# Patient Record
Sex: MALE | Race: WHITE | NOT HISPANIC OR LATINO | Employment: FULL TIME | URBAN - METROPOLITAN AREA
[De-identification: names, ages, dates, MRNs, and addresses within clinical notes are randomized per-mention and may not be internally consistent; named-entity substitution may affect disease eponyms.]

---

## 2023-08-18 DIAGNOSIS — R07.9 CHEST PAIN, UNSPECIFIED TYPE: ICD-10-CM

## 2023-08-18 DIAGNOSIS — R11.0 NAUSEA: ICD-10-CM

## 2023-08-22 ENCOUNTER — APPOINTMENT (OUTPATIENT)
Dept: PRIMARY CARE | Facility: CLINIC | Age: 53
End: 2023-08-22
Payer: COMMERCIAL

## 2023-08-28 NOTE — PROGRESS NOTES
Spoke with patient and informed, patient stated he has PVC's and wanted to know if that's normal as well? Anything he should worry about ?

## 2023-12-20 ENCOUNTER — OFFICE VISIT (OUTPATIENT)
Dept: CARDIOLOGY | Facility: HOSPITAL | Age: 53
End: 2023-12-20
Payer: COMMERCIAL

## 2023-12-20 ENCOUNTER — HOSPITAL ENCOUNTER (OUTPATIENT)
Dept: RADIOLOGY | Facility: HOSPITAL | Age: 53
Discharge: HOME | End: 2023-12-20
Payer: COMMERCIAL

## 2023-12-20 ENCOUNTER — LAB (OUTPATIENT)
Dept: LAB | Facility: LAB | Age: 53
End: 2023-12-20
Payer: COMMERCIAL

## 2023-12-20 VITALS
WEIGHT: 167.77 LBS | BODY MASS INDEX: 24.78 KG/M2 | SYSTOLIC BLOOD PRESSURE: 145 MMHG | OXYGEN SATURATION: 99 % | HEART RATE: 77 BPM | DIASTOLIC BLOOD PRESSURE: 77 MMHG

## 2023-12-20 DIAGNOSIS — R06.02 SHORTNESS OF BREATH: ICD-10-CM

## 2023-12-20 DIAGNOSIS — I26.99 PULMONARY EMBOLISM, UNSPECIFIED CHRONICITY, UNSPECIFIED PULMONARY EMBOLISM TYPE, UNSPECIFIED WHETHER ACUTE COR PULMONALE PRESENT (MULTI): ICD-10-CM

## 2023-12-20 DIAGNOSIS — D68.51 FACTOR V LEIDEN (MULTI): ICD-10-CM

## 2023-12-20 DIAGNOSIS — R06.02 SHORTNESS OF BREATH: Primary | ICD-10-CM

## 2023-12-20 LAB
ANION GAP SERPL CALC-SCNC: 15 MMOL/L (ref 10–20)
BUN SERPL-MCNC: 15 MG/DL (ref 6–23)
CALCIUM SERPL-MCNC: 9.9 MG/DL (ref 8.6–10.3)
CHLORIDE SERPL-SCNC: 104 MMOL/L (ref 98–107)
CO2 SERPL-SCNC: 25 MMOL/L (ref 21–32)
CREAT SERPL-MCNC: 0.92 MG/DL (ref 0.5–1.3)
GFR SERPL CREATININE-BSD FRML MDRD: >90 ML/MIN/1.73M*2
GLUCOSE SERPL-MCNC: 96 MG/DL (ref 74–99)
POTASSIUM SERPL-SCNC: 4.8 MMOL/L (ref 3.5–5.3)
SODIUM SERPL-SCNC: 139 MMOL/L (ref 136–145)

## 2023-12-20 PROCEDURE — 71275 CT ANGIOGRAPHY CHEST: CPT

## 2023-12-20 PROCEDURE — 93005 ELECTROCARDIOGRAM TRACING: CPT | Performed by: INTERNAL MEDICINE

## 2023-12-20 PROCEDURE — 2550000001 HC RX 255 CONTRASTS: Performed by: NURSE PRACTITIONER

## 2023-12-20 PROCEDURE — 1036F TOBACCO NON-USER: CPT | Performed by: INTERNAL MEDICINE

## 2023-12-20 PROCEDURE — 99205 OFFICE O/P NEW HI 60 MIN: CPT | Performed by: INTERNAL MEDICINE

## 2023-12-20 PROCEDURE — 71275 CT ANGIOGRAPHY CHEST: CPT | Performed by: RADIOLOGY

## 2023-12-20 PROCEDURE — 80048 BASIC METABOLIC PNL TOTAL CA: CPT

## 2023-12-20 PROCEDURE — 99215 OFFICE O/P EST HI 40 MIN: CPT | Performed by: INTERNAL MEDICINE

## 2023-12-20 PROCEDURE — 93010 ELECTROCARDIOGRAM REPORT: CPT | Performed by: INTERNAL MEDICINE

## 2023-12-20 PROCEDURE — 36415 COLL VENOUS BLD VENIPUNCTURE: CPT

## 2023-12-20 RX ORDER — CELECOXIB 200 MG/1
200 CAPSULE ORAL 2 TIMES DAILY
COMMUNITY

## 2023-12-20 RX ORDER — FINASTERIDE 5 MG/1
5 TABLET, FILM COATED ORAL DAILY
COMMUNITY
End: 2024-04-17 | Stop reason: WASHOUT

## 2023-12-20 RX ORDER — TADALAFIL 2.5 MG/1
2.5 TABLET ORAL DAILY
COMMUNITY

## 2023-12-20 RX ADMIN — IOHEXOL 57 ML: 350 INJECTION, SOLUTION INTRAVENOUS at 10:09

## 2023-12-20 NOTE — PROGRESS NOTES
Referred by Dr. Wilson for dysnpea      History Of Present Illness:    Lawrence Grier is a 53 y.o. male with h/o Factor V Leiden, PE ~2013 (was on warfarin but had difficulty obtaining a therapeutic INR and was switched Arixtra which he took for 1 year and was advised to stop) presenting today for evaluation of acute dyspnea.  Lawrence is an avid runner-- runs 5 miles 3-4 days per week  over the past 35 years.  Starting this past Saturday (4 days ago) he became significantly SOB while he was running on the treadmill.  He had to stop and catch his breath.  Reports mild chest tightness as well.  Tried to exercise again on Monday and had similar symptoms with severe dyspnea.  Today he was SOB while trying to climb the stairs in his home. Two days prior to onset of SOB he reports having an event of left calf pain with left ankle swelling- pain/swelling present today, but less significant than earlier this week.       Past Medical History:  He has no past medical history on file.    Past Surgical History:  He has no past surgical history on file.      Social History:  He reports that he has never smoked. He has never used smokeless tobacco. No history on file for alcohol use and drug use.    Family History:  No family history on file.     Allergies:  Patient has no known allergies.    Outpatient Medications:  Current Outpatient Medications   Medication Instructions    celecoxib (CELEBREX) 200 mg, oral, 2 times daily    finasteride (PROSCAR) 5 mg, oral, Daily, Do not crush, chew, or split.    tadalafil (CIALIS) 2.5 mg, oral, Daily        Last Recorded Vitals:  Vitals:    12/20/23 0806   BP: 145/77   Pulse: 77   SpO2: 99%   Weight: 76.1 kg (167 lb 12.3 oz)       Physical Exam:  Constitutional: Pleasant, Awake/Alert/Oriented to person place and time. No distress  Head: Atraumatic, Normocephalic  Eyes: EOMI. MALDONADO  Neck:No JVD  Cardiovascular: Regular rate and rhythm, S1, S2. No extra heart sounds or murmurs  Respiratory:  Clear to auscultation bilaterally. No wheezing, rales or rhonchi. Good chest wall expansion  Abdomen: Soft, Nontender, Obese. Bowel sounds appreciated  Musculoskeletal: ROM intact. Muscle strength grossly intact upper and lower extremities 5/5.   Neurological: CNII-XII intact. Sensation grossly intact  Extremities: Warm and dry. Left leg with non-pitting edema   Psychiatric: Appropriate mood and affect         Last Labs:  CBC -  Lab Results   Component Value Date    WBC 6.6 2023    HGB 14.9 2023    HCT 45.5 2023    MCV 91 2023     2023       CMP -  Lab Results   Component Value Date    CALCIUM 9.4 2023    PROT 7.9 2023    ALBUMIN 4.7 2023    AST 30 2023    ALT 26 2023    ALKPHOS 46 2023    BILITOT 0.9 2023       LIPID PANEL -   Lab Results   Component Value Date    CHOL 182 2021    TRIG 125 2021    HDL 58.9 2021    CHHDL 3.1 2021    LDLF 98 2021    VLDL 25 2021       RENAL FUNCTION PANEL -   Lab Results   Component Value Date    GLUCOSE 95 2023     (L) 2023    K 3.7 2023    CL 99 2023    CO2 26 2023    ANIONGAP 12 2023    BUN 14 2023    CREATININE 0.97 2023    GFRMALE >90 2023    CALCIUM 9.4 2023    ALBUMIN 4.7 2023        Lab Results   Component Value Date    BNP 14 2023       Last Cardiology Tests:  EC2023   NSR, HR 66bpm     Stress Test:  2023  Summary:  1. The resting ejection fraction was estimated at 60 to 65% with a peak exercise ejection fraction estimated at 65 to 70%.  2. Normal global left ventricular systolic function.  3. Adequate level of stress achieved.  4. No electrocardiographic, clinical or echocardiographic evidence for ischemia at a maximal workload.  5. No ischemia by ECG at max workload.  6. Normal Stress Test.      Lab review: I have personally reviewed the laboratory result(s)    Diagnostic review: I have personally reviewed the result(s) of the stress echocardiogram     Assessment/Plan   Very pleasant 53 y.o. male with h/o Factor V Leiden, PE ~2013 (was on warfarin but had difficulty obtaining a therapeutic INR and was switched Arixtra which he took for 1 year and was advised to stop) presenting today for evaluation of acute dyspnea.  Symptoms are concerning for acute PE.    Plan:  Recommend CTA chest to evaluate for acute PE-- patient underwent testing today and study is positive for acute bilateral PE without evidence of RV strain.  He is hemodynamically stable and not hypoxic at rest (SPO2 99%).  We will start patient on apixaban 10mg BID for 7 days, then decrease to 5mg BID.  Will arrange for follow up with Vascular Medicine for further evaluation and treatment of recurrent PE with h/o Factor V Leiden.        Yamini Kerr, APRN-CNP  Kenn Tate MD

## 2023-12-21 NOTE — TELEPHONE ENCOUNTER
I tried to call Lawrence, but he was not able to answer.  He likely has a DVT in the left leg, and this is most likely what caused the PE.  We discussed getting an US yesterday, but Dr. Tate did not feel he needed it as it would not  since the PE study is positive he is being treated with apixaban anyway.

## 2024-01-04 ENCOUNTER — OFFICE VISIT (OUTPATIENT)
Dept: CARDIOLOGY | Facility: CLINIC | Age: 54
End: 2024-01-04
Payer: COMMERCIAL

## 2024-01-04 VITALS
HEART RATE: 77 BPM | WEIGHT: 165 LBS | DIASTOLIC BLOOD PRESSURE: 80 MMHG | BODY MASS INDEX: 24.37 KG/M2 | SYSTOLIC BLOOD PRESSURE: 117 MMHG | OXYGEN SATURATION: 98 %

## 2024-01-04 DIAGNOSIS — I26.99 PULMONARY EMBOLISM, UNSPECIFIED CHRONICITY, UNSPECIFIED PULMONARY EMBOLISM TYPE, UNSPECIFIED WHETHER ACUTE COR PULMONALE PRESENT (MULTI): Primary | ICD-10-CM

## 2024-01-04 PROCEDURE — 1036F TOBACCO NON-USER: CPT | Performed by: INTERNAL MEDICINE

## 2024-01-04 PROCEDURE — 99214 OFFICE O/P EST MOD 30 MIN: CPT | Performed by: INTERNAL MEDICINE

## 2024-01-04 NOTE — PATIENT INSTRUCTIONS
I want you to get an ultrasound of both legs when you can.    I would also like you to get an echocardiogram to assess the right side of the heart.    To schedule, you should call the [facility] scheduling line at [phone].

## 2024-01-04 NOTE — PROGRESS NOTES
Referred by Dr. Kenn Tate for recent PE     History of Present Illness:    Lawrence Grier is a 53 y.o. man here for follow-up of recurrent PE.    He has a history of prior PE in 2013 (factor V Leiden positive); anticoagulated with warfarin but transitioned to fondaparinux due to difficulty maintaining therapeutic INR. He was kept on fondaparinux for about a year and then this was stopped.    He runs for fitness 3-4 days a week and suddenly noticed shortness of breath with his run a couple weeks ago. He noticed associated chest tightness. Sought care and found to have new PE that appeared to be low to moderate thrombus burden. He was treated as an outpatient with apixaban.    Since then he has overall felt better. Took off about a week from running then resumed at a much lower intensity and overall felt better.    Notes that prior to PE diagnosis he had some mild left calf pain a couple days beforehand with associated very minimal ankle swelling. Since PE diagnosis he has noticed a new vein popping out on the back of his left calf that he doesn't think he had before.    He denies bleeding including epistaxis, gingival bleeding, hemoptysis, hematemesis, hematochezia, melena, and hematuria.    He is up to date on age/gender-appropriate malignancy screening.    Past Medical History:  He has no past medical history on file.    Past Surgical History:  He has no past surgical history on file.      Social History:  He reports that he has never smoked. He has never used smokeless tobacco. No history on file for alcohol use and drug use.    Family History:  No family history on file.     Allergies:  Patient has no known allergies.    Outpatient Medications:  Current Outpatient Medications   Medication Instructions    apixaban (Eliquis) 5 mg (74 tabs) tablet Take 2 tablets (10 mg) by mouth 2 times a day for 7 days, then take 1 tablet (5 mg) by mouth 2 times a day.    celecoxib (CELEBREX) 200 mg, oral, 2 times daily     finasteride (PROSCAR) 5 mg, oral, Daily, Do not crush, chew, or split.    tadalafil (CIALIS) 2.5 mg, oral, Daily        Last Recorded Vitals:  Vitals:    01/04/24 1403 01/04/24 1404   BP: 132/80 117/80   BP Location: Left arm Right arm   Pulse: 77    SpO2: 98%    Weight: 74.8 kg (165 lb)         Last Labs:  CBC -  Lab Results   Component Value Date    WBC 6.6 08/13/2023    HGB 14.9 08/13/2023    HCT 45.5 08/13/2023    MCV 91 08/13/2023     08/13/2023       CMP -  Lab Results   Component Value Date    CALCIUM 9.9 12/20/2023    PROT 7.9 08/13/2023    ALBUMIN 4.7 08/13/2023    AST 30 08/13/2023    ALT 26 08/13/2023    ALKPHOS 46 08/13/2023    BILITOT 0.9 08/13/2023       LIPID PANEL -   Lab Results   Component Value Date    CHOL 182 02/06/2021    TRIG 125 02/06/2021    HDL 58.9 02/06/2021    CHHDL 3.1 02/06/2021    LDLF 98 02/06/2021    VLDL 25 02/06/2021       RENAL FUNCTION PANEL -   Lab Results   Component Value Date    GLUCOSE 96 12/20/2023     12/20/2023    K 4.8 12/20/2023     12/20/2023    CO2 25 12/20/2023    ANIONGAP 15 12/20/2023    BUN 15 12/20/2023    CREATININE 0.92 12/20/2023    GFRMALE >90 08/13/2023    CALCIUM 9.9 12/20/2023    ALBUMIN 4.7 08/13/2023        Lab Results   Component Value Date    BNP 14 08/13/2023     CTA chest 12/20/2023  IMPRESSION:  1.  This exam is positive for the presence of acute pulmonary emboli.  There is no convincing evidence for right ventricular strain.      As per standard protocol, the referring provider is contacted with  this information at the time of this reading using the electronic  medical record message in system.    Assessment/Plan   Diagnoses and all orders for this visit:  Pulmonary embolism, unspecified chronicity, unspecified pulmonary embolism type, unspecified whether acute cor pulmonale present (CMS/HCC)  -     Vascular US Lower Extremity Venous Duplex Bilateral; Future  -     Transthoracic echo (TTE) complete; Future  -     apixaban  (Eliquis) 5 mg tablet; Take 1 tablet (5 mg) by mouth 2 times a day.  Other orders  -     perflutren lipid microspheres (Definity) injection 0.5-10 mL of dilution    Consuelo Bearden MD

## 2024-01-11 DIAGNOSIS — L67.9: Primary | ICD-10-CM

## 2024-01-11 DIAGNOSIS — L73.9: Primary | ICD-10-CM

## 2024-01-11 RX ORDER — FINASTERIDE 1 MG/1
1 TABLET, FILM COATED ORAL DAILY
Qty: 90 TABLET | Refills: 3 | Status: SHIPPED | OUTPATIENT
Start: 2024-01-11

## 2024-01-18 ENCOUNTER — HOSPITAL ENCOUNTER (OUTPATIENT)
Dept: VASCULAR MEDICINE | Facility: HOSPITAL | Age: 54
Discharge: HOME | End: 2024-01-18
Payer: COMMERCIAL

## 2024-01-18 ENCOUNTER — HOSPITAL ENCOUNTER (OUTPATIENT)
Dept: CARDIOLOGY | Facility: HOSPITAL | Age: 54
Discharge: HOME | End: 2024-01-18
Payer: COMMERCIAL

## 2024-01-18 DIAGNOSIS — I26.99 PULMONARY EMBOLISM, UNSPECIFIED CHRONICITY, UNSPECIFIED PULMONARY EMBOLISM TYPE, UNSPECIFIED WHETHER ACUTE COR PULMONALE PRESENT (MULTI): ICD-10-CM

## 2024-01-18 PROCEDURE — 93306 TTE W/DOPPLER COMPLETE: CPT

## 2024-01-18 PROCEDURE — 93970 EXTREMITY STUDY: CPT

## 2024-01-18 PROCEDURE — 93970 EXTREMITY STUDY: CPT | Performed by: INTERNAL MEDICINE

## 2024-01-18 PROCEDURE — 93306 TTE W/DOPPLER COMPLETE: CPT | Performed by: INTERNAL MEDICINE

## 2024-01-19 LAB
AORTIC VALVE MEAN GRADIENT: 3 MMHG
AORTIC VALVE PEAK VELOCITY: 1.22 M/S
AV PEAK GRADIENT: 6 MMHG
AVA (PEAK VEL): 2.27 CM2
AVA (VTI): 1.98 CM2
EJECTION FRACTION APICAL 4 CHAMBER: 64.2
EJECTION FRACTION: 62 %
LEFT ATRIUM VOLUME AREA LENGTH INDEX BSA: 30.5 ML/M2
LEFT VENTRICLE INTERNAL DIMENSION DIASTOLE: 4.5 CM (ref 3.5–6)
LEFT VENTRICULAR OUTFLOW TRACT DIAMETER: 2.2 CM
MITRAL VALVE E/A RATIO: 1.35
MITRAL VALVE E/E' RATIO: 7.84
RIGHT VENTRICLE PEAK SYSTOLIC PRESSURE: 22.4 MMHG
TRICUSPID ANNULAR PLANE SYSTOLIC EXCURSION: 2.5 CM

## 2024-02-14 LAB
ATRIAL RATE: 66 BPM
P AXIS: 71 DEGREES
P OFFSET: 189 MS
P ONSET: 135 MS
PR INTERVAL: 166 MS
Q ONSET: 218 MS
QRS COUNT: 11 BEATS
QRS DURATION: 94 MS
QT INTERVAL: 394 MS
QTC CALCULATION(BAZETT): 413 MS
QTC FREDERICIA: 407 MS
R AXIS: 74 DEGREES
T AXIS: 64 DEGREES
T OFFSET: 415 MS
VENTRICULAR RATE: 66 BPM

## 2024-04-04 ENCOUNTER — APPOINTMENT (OUTPATIENT)
Dept: CARDIOLOGY | Facility: CLINIC | Age: 54
End: 2024-04-04
Payer: COMMERCIAL

## 2024-04-11 ENCOUNTER — OFFICE VISIT (OUTPATIENT)
Dept: CARDIOLOGY | Facility: CLINIC | Age: 54
End: 2024-04-11
Payer: COMMERCIAL

## 2024-04-11 VITALS
BODY MASS INDEX: 23.63 KG/M2 | WEIGHT: 160 LBS | OXYGEN SATURATION: 98 % | DIASTOLIC BLOOD PRESSURE: 81 MMHG | HEART RATE: 58 BPM | SYSTOLIC BLOOD PRESSURE: 125 MMHG

## 2024-04-11 DIAGNOSIS — I26.99 PULMONARY EMBOLISM, UNSPECIFIED CHRONICITY, UNSPECIFIED PULMONARY EMBOLISM TYPE, UNSPECIFIED WHETHER ACUTE COR PULMONALE PRESENT (MULTI): ICD-10-CM

## 2024-04-11 PROCEDURE — 99214 OFFICE O/P EST MOD 30 MIN: CPT | Performed by: INTERNAL MEDICINE

## 2024-04-11 NOTE — PROGRESS NOTES
No chief complaint on file.      History of Present Illness:  Lawrence Grier is a/an 53 y.o. man who follows up for unprovoked PE. He is on anticoagulation with apixaban. After I saw him last we got a venous duplex ultrasound (about a month after PE dx), which showed chronic changes in the left posterior tibial vein.    He denies bleeding including epistaxis, gingival bleeding, hemoptysis, hematemesis, hematochezia, melena, and hematuria.    No shortness of breath. No leg swelling. Occasional left calf discomfort.     No past medical history on file.  No past surgical history on file.  Social History     Tobacco Use    Smoking status: Never    Smokeless tobacco: Never     No family history on file.  Current Outpatient Medications   Medication Sig Dispense Refill    celecoxib (CeleBREX) 200 mg capsule Take 1 capsule (200 mg) by mouth 2 times a day.      finasteride (Propecia) 1 mg tablet TAKE ONE TABLET BY MOUTH ONCE DAILY 90 tablet 3    finasteride (Proscar) 5 mg tablet Take 1 tablet (5 mg) by mouth once daily. Do not crush, chew, or split.      tadalafil (Cialis) 2.5 mg tablet Take 1 tablet (2.5 mg) by mouth once daily.      apixaban (Eliquis) 5 mg tablet Take 1 tablet (5 mg) by mouth 2 times a day. 180 tablet 3     No current facility-administered medications for this visit.       Physical Examination:  Blood pressure 125/81, pulse 58, weight 72.6 kg (160 lb), SpO2 98%.  No distress  No JVD or carotid bruits  Lungs clear bilaterally  Heart regular and without murmurs  Abdomen soft and non-tender  No leg swelling  Pulses intact    Pertinent Labs:    Pertinent Imaging:  Venous duplex ultrasound 1/18/2024   CONCLUSIONS:     Right Lower Venous: No evidence of acute deep vein thrombus visualized in the right lower extremity.     Left Lower Venous: No evidence of acute deep vein thrombus visualized in the left lower extremity. There are chronic changes visualized in the posterior tibial vein.    Encounter Diagnosis    Name Primary?    Pulmonary embolism, unspecified chronicity, unspecified pulmonary embolism type, unspecified whether acute cor pulmonale present (Multi)    Continue indefinite anticoagulation     Follow up in 6 months.    Consuelo Bearden MD, MS

## 2024-04-16 ASSESSMENT — PROMIS GLOBAL HEALTH SCALE
RATE_MENTAL_HEALTH: VERY GOOD
RATE_SOCIAL_SATISFACTION: VERY GOOD
RATE_AVERAGE_PAIN: 4
RATE_QUALITY_OF_LIFE: VERY GOOD
CARRYOUT_PHYSICAL_ACTIVITIES: MOSTLY
CARRYOUT_SOCIAL_ACTIVITIES: VERY GOOD
RATE_PHYSICAL_HEALTH: GOOD
EMOTIONAL_PROBLEMS: RARELY
RATE_AVERAGE_FATIGUE: MILD
RATE_GENERAL_HEALTH: GOOD

## 2024-04-17 ENCOUNTER — OFFICE VISIT (OUTPATIENT)
Dept: PRIMARY CARE | Facility: CLINIC | Age: 54
End: 2024-04-17
Payer: COMMERCIAL

## 2024-04-17 VITALS
BODY MASS INDEX: 24.2 KG/M2 | DIASTOLIC BLOOD PRESSURE: 78 MMHG | TEMPERATURE: 98.5 F | WEIGHT: 163.4 LBS | HEIGHT: 69 IN | SYSTOLIC BLOOD PRESSURE: 125 MMHG | OXYGEN SATURATION: 100 % | HEART RATE: 58 BPM

## 2024-04-17 DIAGNOSIS — I26.99 PULMONARY EMBOLISM WITHOUT ACUTE COR PULMONALE, UNSPECIFIED CHRONICITY, UNSPECIFIED PULMONARY EMBOLISM TYPE (MULTI): ICD-10-CM

## 2024-04-17 DIAGNOSIS — N40.0 BENIGN PROSTATIC HYPERPLASIA WITHOUT LOWER URINARY TRACT SYMPTOMS: ICD-10-CM

## 2024-04-17 DIAGNOSIS — Z00.00 ROUTINE HEALTH MAINTENANCE: Primary | ICD-10-CM

## 2024-04-17 DIAGNOSIS — R53.83 OTHER FATIGUE: ICD-10-CM

## 2024-04-17 PROCEDURE — 99396 PREV VISIT EST AGE 40-64: CPT | Performed by: INTERNAL MEDICINE

## 2024-04-17 PROCEDURE — 1036F TOBACCO NON-USER: CPT | Performed by: INTERNAL MEDICINE

## 2024-04-17 RX ORDER — DIAZEPAM 10 MG/1
TABLET ORAL EVERY 8 HOURS PRN
COMMUNITY

## 2024-04-17 ASSESSMENT — ENCOUNTER SYMPTOMS
LOSS OF SENSATION IN FEET: 0
DEPRESSION: 0
OCCASIONAL FEELINGS OF UNSTEADINESS: 0

## 2024-04-17 NOTE — PROGRESS NOTES
Chief Complaint   Patient presents with    [facility]         History Of Present Illness:    Lawrence Grier is a 53 y.o. male presenting to establish care, update health maintenance and address concerns about hair loss and potential side effects from finasteride.  Lawrence started finasteride 3 years ago.  He initiated the medication for hair loss, and seems to be working.  He is now experiencing symptoms that may be related to the medication.  He endorses fatigue and feels very washed out by the end of the day.  He is using muscle mass and gaining weight on his midsection.  His libido is down, and is more difficult for him to maintain a rigid erection.  He has not had a testosterone level assessed recently.    He has a history of multiple pulmonary emboli.  The first was in 2013, and he was diagnosed with multiple bilateral pulmonary emboli on 12/20/2023.  He is now on Eliquis 5 mg twice daily.  He was evaluated by cardiologist and a vascular specialist.  It is not clear if he met with a hematologist or had a complete hypercoagulability workup.  He does not have any family history of blood clots.  He is a runner and does a 5 mile run 3 to 4 days/week.  He denies chest pain or shortness of breath with physical exertion.  He completed a stress test on 8/5/2022 that showed preserved LV function and no ischemia.  Appetite is normal he denies any change in bowel habits.  He denies any new urinary symptoms.    1/18/24  Echocardiogram - EF 65%    Stress test 8/25/23  Summary:  1. The resting ejection fraction was estimated at 60 to 65% with a peak exercise ejection fraction estimated at 65 to 70%.  2. Normal global left ventricular systolic function.  3. Adequate level of stress achieved.  4. No electrocardiographic, clinical or echocardiographic evidence for ischemia at a maximal workload.  5. No ischemia by ECG at max workload.  6. Normal Stress Test.    CT 12/20/23  Multiple PE       Active Medical Problems:  Patient  Active Problem List    Diagnosis Date Noted    Pulmonary embolus (Multi) 04/18/2024    Androgenic alopecia 04/18/2024    Factor V Leiden (Multi) 04/18/2024    Routine health maintenance 04/18/2024    Other fatigue 04/18/2024       Past Medical History:  Past Medical History:   Diagnosis Date    Androgenic alopecia     Factor V Leiden (Multi)     Pulmonary embolus (Multi)     2 separate unprovoked PE       Past Surgical History:  Past Surgical History:   Procedure Laterality Date    LUMBAR EPIDURAL INJECTION           Social History:  Social History     Tobacco Use    Smoking status: Never    Smokeless tobacco: Never   Vaping Use    Vaping status: Never Used   Substance Use Topics    Alcohol use: Yes     Alcohol/week: 7.0 standard drinks of alcohol     Types: 7 Cans of beer per week     Comment: 1 beer per day    Drug use: Never         Family History:  Family History   Problem Relation Name Age of Onset    Breast cancer Mother Darleen Grier     No Known Problems Father      No Known Problems Sister      No Known Problems Sister      No Known Problems Daughter          Jr in College    No Known Problems Son      No Known Problems Son          Medical school - [employer]        Allergies:  Patient has no known allergies.    Outpatient Medications:    Current Outpatient Medications:     apixaban (Eliquis) 5 mg tablet, Take 1 tablet (5 mg) by mouth 2 times a day., Disp: 180 tablet, Rfl: 3    finasteride (Propecia) 1 mg tablet, TAKE ONE TABLET BY MOUTH ONCE DAILY, Disp: 90 tablet, Rfl: 3    tadalafil (Cialis) 2.5 mg tablet, Take 1 tablet (2.5 mg) by mouth once daily., Disp: , Rfl:     celecoxib (CeleBREX) 200 mg capsule, Take 1 capsule (200 mg) by mouth 2 times a day., Disp: , Rfl:     diazePAM (Valium) 10 mg tablet, Take by mouth every 8 hours if needed., Disp: , Rfl:     Review of Systems:  Pertinent positives in review of systems outlined above.  Complete ROS otherwise negative.        Last Recorded Vitals:  Vitals:     "04/17/24 1043   BP: 125/78   BP Location: Right arm   Patient Position: Sitting   BP Cuff Size: Large adult   Pulse: 58   Temp: 36.9 °C (98.5 °F)   SpO2: 100%   Weight: 74.1 kg (163 lb 6.4 oz)   Height: 1.753 m (5' 9\")   Body mass index is 24.13 kg/m².        Physical Exam  Vitals reviewed.   Constitutional:       Appearance: Normal appearance.   HENT:      Mouth/Throat:      Pharynx: Oropharynx is clear.   Eyes:      Extraocular Movements: Extraocular movements intact.      Conjunctiva/sclera: Conjunctivae normal.      Pupils: Pupils are equal, round, and reactive to light.   Neck:      Vascular: No carotid bruit.   Cardiovascular:      Rate and Rhythm: Normal rate and regular rhythm.   Pulmonary:      Effort: Pulmonary effort is normal.      Breath sounds: Normal breath sounds.   Abdominal:      General: Abdomen is flat. Bowel sounds are normal.      Palpations: Abdomen is soft. There is no mass.      Tenderness: There is no abdominal tenderness. There is no right CVA tenderness or left CVA tenderness.   Musculoskeletal:      Cervical back: No tenderness.      Right lower leg: No edema.      Left lower leg: No edema.   Lymphadenopathy:      Cervical: No cervical adenopathy.   Neurological:      General: No focal deficit present.      Mental Status: He is alert and oriented to person, place, and time.   Psychiatric:         Mood and Affect: Mood normal.      `    Assessment/Plan   Problem List Items Addressed This Visit       Pulmonary embolus (Multi)     Lawrence has had 2 unprovoked pulmonary emboli.  I do not see extensive hypercoagulability testing in his chart.  He does report history of factor V Leiden mutation.  I will review his chart and update hypercoagulability testing if previous workup was incomplete.  He will continue on Eliquis twice daily.  He has follow-up with the vascular surgeon and cardiologist scheduled.         Relevant Orders    Comprehensive Metabolic Panel    Routine health maintenance - " Primary     Blood pressure is in good range.  Fasting blood sugar and fasting lipid profile will be obtained now.  Prostate cancer screening will be updated.  Colon cancer screening is up-to-date.  Eye exam encouraged.  Tdap vaccine recommended, will updated next visit.  Encouraged shingles vaccine.    We discussed that he can improve his health and lower risk for chronic disease by making healthy lifestyle changes.  I recommended 30 min of moderate intensity aerobic exercise 5 days per week and 2 days of muscle building exercises.  We reviewed the elements of a healthy plant-based, whole-food diet, and Kel was provided online resources to help with meal planning.  I encouraged Lawrence to get at least 7 hours of restful sleep at night and to work on a method for managing stress. I provided online resources to help with self-care.   I recommended 30 min of moderate intensity aerobic exercise 5 days per week and 2 days of muscle building exercises.  We reviewed the elements of a healthy plant-based, whole-food diet, and Lawrence was provided online resources to help with meal planning.  I encouraged Lawrence to get at least 7 hours of restful sleep at night and to work on a method for managing stress. I provided online resources to help with self-care.            Relevant Orders    Comprehensive Metabolic Panel    Lipid Panel    Other fatigue     Lawrence endorses fatigue, diminished libido, decreased erection quality, loss of muscle mass and visceral weight gain.  He will be screened for low testosterone.  TSH will be included.         Relevant Orders    Comprehensive Metabolic Panel    TSH with reflex to Free T4 if abnormal    CBC and Auto Differential    Testosterone,Free and Total     Other Visit Diagnoses       Benign prostatic hyperplasia without lower urinary tract symptoms        Relevant Orders    Prostate Specific Antigen              Zohaib Coffey MD

## 2024-04-18 PROBLEM — R53.83 OTHER FATIGUE: Status: ACTIVE | Noted: 2024-04-18

## 2024-04-18 PROBLEM — L64.9 ANDROGENIC ALOPECIA: Status: ACTIVE | Noted: 2024-04-18

## 2024-04-18 PROBLEM — I26.99 PULMONARY EMBOLUS (MULTI): Status: ACTIVE | Noted: 2024-04-18

## 2024-04-18 PROBLEM — Z00.00 ROUTINE HEALTH MAINTENANCE: Status: ACTIVE | Noted: 2024-04-18

## 2024-04-18 PROBLEM — D68.51 FACTOR V LEIDEN (MULTI): Status: ACTIVE | Noted: 2024-04-18

## 2024-04-18 NOTE — ASSESSMENT & PLAN NOTE
Lawrence endorses fatigue, diminished libido, decreased erection quality, loss of muscle mass and visceral weight gain.  He will be screened for low testosterone.  TSH will be included.

## 2024-04-18 NOTE — ASSESSMENT & PLAN NOTE
Hair loss responded to finasteride.  However is now having symptoms that may be secondary to finasteride.  He will complete metabolic screening, we discussed switching from finasteride to oral minoxidil if his lab work is unremarkable.

## 2024-04-18 NOTE — ASSESSMENT & PLAN NOTE
Blood pressure is in good range.  Fasting blood sugar and fasting lipid profile will be obtained now.  Prostate cancer screening will be updated.  Colon cancer screening is up-to-date.  Eye exam encouraged.  Tdap vaccine recommended, will updated next visit.  Encouraged shingles vaccine.    We discussed that he can improve his health and lower risk for chronic disease by making healthy lifestyle changes.  I recommended 30 min of moderate intensity aerobic exercise 5 days per week and 2 days of muscle building exercises.  We reviewed the elements of a healthy plant-based, whole-food diet, and Kel was provided online resources to help with meal planning.  I encouraged Lawrence to get at least 7 hours of restful sleep at night and to work on a method for managing stress. I provided online resources to help with self-care.   I recommended 30 min of moderate intensity aerobic exercise 5 days per week and 2 days of muscle building exercises.  We reviewed the elements of a healthy plant-based, whole-food diet, and Lawrence was provided online resources to help with meal planning.  I encouraged Lawrence to get at least 7 hours of restful sleep at night and to work on a method for managing stress. I provided online resources to help with self-care.

## 2024-04-18 NOTE — ASSESSMENT & PLAN NOTE
Lawrence has had 2 unprovoked pulmonary emboli.  I do not see extensive hypercoagulability testing in his chart.  He does report history of factor V Leiden mutation.  I will review his chart and update hypercoagulability testing if previous workup was incomplete.  He will continue on Eliquis twice daily.  He has follow-up with the vascular surgeon and cardiologist scheduled.

## 2024-04-21 DIAGNOSIS — N52.9 ERECTILE DYSFUNCTION, UNSPECIFIED ERECTILE DYSFUNCTION TYPE: Primary | ICD-10-CM

## 2024-04-23 RX ORDER — TADALAFIL 5 MG/1
5 TABLET ORAL DAILY
Qty: 90 TABLET | Refills: 3 | Status: SHIPPED | OUTPATIENT
Start: 2024-04-23

## 2024-04-24 ENCOUNTER — APPOINTMENT (OUTPATIENT)
Dept: HEMATOLOGY/ONCOLOGY | Facility: HOSPITAL | Age: 54
End: 2024-04-24
Payer: COMMERCIAL

## 2024-05-13 ENCOUNTER — OFFICE VISIT (OUTPATIENT)
Dept: PRIMARY CARE | Facility: HOSPITAL | Age: 54
End: 2024-05-13
Payer: COMMERCIAL

## 2024-05-13 VITALS
WEIGHT: 164.4 LBS | DIASTOLIC BLOOD PRESSURE: 81 MMHG | BODY MASS INDEX: 24.28 KG/M2 | HEART RATE: 63 BPM | OXYGEN SATURATION: 97 % | SYSTOLIC BLOOD PRESSURE: 143 MMHG | TEMPERATURE: 98 F

## 2024-05-13 DIAGNOSIS — J98.8 RESPIRATORY INFECTION: Primary | ICD-10-CM

## 2024-05-13 PROCEDURE — 1036F TOBACCO NON-USER: CPT | Performed by: INTERNAL MEDICINE

## 2024-05-13 PROCEDURE — 99213 OFFICE O/P EST LOW 20 MIN: CPT | Performed by: INTERNAL MEDICINE

## 2024-05-13 RX ORDER — ALBUTEROL SULFATE 90 UG/1
2 AEROSOL, METERED RESPIRATORY (INHALATION) EVERY 6 HOURS PRN
Qty: 18 G | Refills: 1 | Status: SHIPPED | OUTPATIENT
Start: 2024-05-13 | End: 2025-05-13

## 2024-05-13 RX ORDER — AZITHROMYCIN 250 MG/1
TABLET, FILM COATED ORAL DAILY
Qty: 6 TABLET | Refills: 0 | Status: SHIPPED | OUTPATIENT
Start: 2024-05-13 | End: 2024-05-18

## 2024-05-13 RX ORDER — METHYLPREDNISOLONE 4 MG/1
TABLET ORAL
Qty: 21 TABLET | Refills: 0 | Status: SHIPPED | OUTPATIENT
Start: 2024-05-13 | End: 2024-05-20

## 2024-05-13 NOTE — PATIENT INSTRUCTIONS
Begin azithromycin and medrol    Use albuterol as needed     Push fluids    Call if not improving

## 2024-05-13 NOTE — ASSESSMENT & PLAN NOTE
Wheezing and rhales on lung exam.  Will cover for CAP with azithromycin.  Will add medrol and albuterol for wheezing.  To call if not improving in the next 2-3 d.

## 2024-05-13 NOTE — PROGRESS NOTES
Chief Complaint:   Cough (Soret throat, eyes burning fever yesterday)     History Of Present Illness:    Lawrence Grier is a 53 y.o. male with active medical problems outlined below who presents for evaluation and management cough and congestion.    Onset of symptoms on Wed - started with cough, then congestion/sneezing/burning eyes.  Cough not productive.  Some wheezing, feeling out of breath.  Used older albuterol inhaler and had relief.  Having sweats and fever.         Patient Active Problem List   Diagnosis    Pulmonary embolus (Multi)    Androgenic alopecia    Factor V Leiden (Multi)    Routine health maintenance    Other fatigue    Respiratory infection       Current Outpatient Medications:     apixaban (Eliquis) 5 mg tablet, Take 1 tablet (5 mg) by mouth 2 times a day., Disp: 180 tablet, Rfl: 3    celecoxib (CeleBREX) 200 mg capsule, Take 1 capsule (200 mg) by mouth 2 times a day., Disp: , Rfl:     finasteride (Propecia) 1 mg tablet, TAKE ONE TABLET BY MOUTH ONCE DAILY, Disp: 90 tablet, Rfl: 3    tadalafil (Cialis) 2.5 mg tablet, Take 1 tablet (2.5 mg) by mouth once daily., Disp: , Rfl:     albuterol 90 mcg/actuation inhaler, Inhale 2 puffs every 6 hours if needed for wheezing., Disp: 18 g, Rfl: 1    azithromycin (Zithromax) 250 mg tablet, Take 2 tablets (500 mg) by mouth once daily for 1 day, THEN 1 tablet (250 mg) once daily for 4 days. Take 2 tabs (500 mg) by mouth today, than 1 daily for 4 days.., Disp: 6 tablet, Rfl: 0    diazePAM (Valium) 10 mg tablet, Take by mouth every 8 hours if needed., Disp: , Rfl:     methylPREDNISolone (Medrol Dospak) 4 mg tablets, Take as directed on package., Disp: 21 tablet, Rfl: 0    tadalafil (Cialis) 5 mg tablet, TAKE ONE TABLET BY MOUTH ONCE DAILY (Patient not taking: Reported on 5/13/2024), Disp: 90 tablet, Rfl: 3  Patient has no known allergies.  Social History     Tobacco Use    Smoking status: Never    Smokeless tobacco: Never   Vaping Use    Vaping status: Never Used    Substance Use Topics    Alcohol use: Yes     Alcohol/week: 7.0 standard drinks of alcohol     Types: 7 Cans of beer per week     Comment: 1 beer per day    Drug use: Never         All pertinent aspects of medical and surgical history were reviewed and updated in chart    Review of Systems   Pertinent positives in review of systems outlined above.  Complete ROS otherwise negative.        Last Recorded Vitals:  Patient Vitals for the past 24 hrs:   BP Temp Pulse SpO2 Weight   05/13/24 1047 143/81 36.7 °C (98 °F) 63 97 % 74.6 kg (164 lb 6.4 oz)          Physical Exam  HENT:      Mouth/Throat:      Pharynx: Oropharynx is clear.   Eyes:      Extraocular Movements: Extraocular movements intact.      Conjunctiva/sclera: Conjunctivae normal.      Pupils: Pupils are equal, round, and reactive to light.   Cardiovascular:      Heart sounds: Normal heart sounds.   Pulmonary:      Comments: Bilateral end expiratory wheezes.  Rhales at left base clear with cough   Lymphadenopathy:      Cervical: No cervical adenopathy.           Assessment/Plan   Problem List Items Addressed This Visit       Respiratory infection - Primary     Wheezing and rhales on lung exam.  Will cover for CAP with azithromycin.  Will add medrol and albuterol for wheezing.  To call if not improving in the next 2-3 d.           Relevant Medications    methylPREDNISolone (Medrol Dospak) 4 mg tablets    azithromycin (Zithromax) 250 mg tablet    albuterol 90 mcg/actuation inhaler       A total of 20 minutes was spent reviewing the chart and recent testing and discussing plan of care.       Zohaib Coffey MD

## 2024-05-23 ENCOUNTER — PATIENT MESSAGE (OUTPATIENT)
Dept: PRIMARY CARE | Facility: CLINIC | Age: 54
End: 2024-05-23
Payer: COMMERCIAL

## 2024-05-23 DIAGNOSIS — D64.9 ANEMIA, UNSPECIFIED TYPE: Primary | ICD-10-CM

## 2024-05-28 ENCOUNTER — LAB (OUTPATIENT)
Dept: LAB | Facility: LAB | Age: 54
End: 2024-05-28
Payer: COMMERCIAL

## 2024-05-28 DIAGNOSIS — I26.99 PULMONARY EMBOLISM WITHOUT ACUTE COR PULMONALE, UNSPECIFIED CHRONICITY, UNSPECIFIED PULMONARY EMBOLISM TYPE (MULTI): ICD-10-CM

## 2024-05-28 DIAGNOSIS — D64.9 ANEMIA, UNSPECIFIED TYPE: ICD-10-CM

## 2024-05-28 DIAGNOSIS — N40.0 BENIGN PROSTATIC HYPERPLASIA WITHOUT LOWER URINARY TRACT SYMPTOMS: ICD-10-CM

## 2024-05-28 DIAGNOSIS — Z00.00 ROUTINE HEALTH MAINTENANCE: ICD-10-CM

## 2024-05-28 DIAGNOSIS — R53.83 OTHER FATIGUE: ICD-10-CM

## 2024-05-28 LAB
ALBUMIN SERPL BCP-MCNC: 4.1 G/DL (ref 3.4–5)
ALP SERPL-CCNC: 46 U/L (ref 33–120)
ALT SERPL W P-5'-P-CCNC: 18 U/L (ref 10–52)
ANION GAP SERPL CALC-SCNC: 11 MMOL/L (ref 10–20)
AST SERPL W P-5'-P-CCNC: 20 U/L (ref 9–39)
BASOPHILS # BLD AUTO: 0.03 X10*3/UL (ref 0–0.1)
BASOPHILS NFR BLD AUTO: 0.4 %
BILIRUB SERPL-MCNC: 0.6 MG/DL (ref 0–1.2)
BUN SERPL-MCNC: 20 MG/DL (ref 6–23)
CALCIUM SERPL-MCNC: 9.2 MG/DL (ref 8.6–10.6)
CHLORIDE SERPL-SCNC: 107 MMOL/L (ref 98–107)
CHOLEST SERPL-MCNC: 218 MG/DL (ref 0–199)
CHOLESTEROL/HDL RATIO: 3.7
CO2 SERPL-SCNC: 27 MMOL/L (ref 21–32)
CREAT SERPL-MCNC: 0.89 MG/DL (ref 0.5–1.3)
EGFRCR SERPLBLD CKD-EPI 2021: >90 ML/MIN/1.73M*2
EOSINOPHIL # BLD AUTO: 0.07 X10*3/UL (ref 0–0.7)
EOSINOPHIL NFR BLD AUTO: 0.9 %
ERYTHROCYTE [DISTWIDTH] IN BLOOD BY AUTOMATED COUNT: 12.6 % (ref 11.5–14.5)
GLUCOSE SERPL-MCNC: 88 MG/DL (ref 74–99)
HCT VFR BLD AUTO: 40.8 % (ref 41–52)
HDLC SERPL-MCNC: 59.1 MG/DL
HGB BLD-MCNC: 13.1 G/DL (ref 13.5–17.5)
IMM GRANULOCYTES # BLD AUTO: 0.01 X10*3/UL (ref 0–0.7)
IMM GRANULOCYTES NFR BLD AUTO: 0.1 % (ref 0–0.9)
LDLC SERPL CALC-MCNC: 131 MG/DL
LYMPHOCYTES # BLD AUTO: 1.77 X10*3/UL (ref 1.2–4.8)
LYMPHOCYTES NFR BLD AUTO: 23 %
MCH RBC QN AUTO: 29.7 PG (ref 26–34)
MCHC RBC AUTO-ENTMCNC: 32.1 G/DL (ref 32–36)
MCV RBC AUTO: 93 FL (ref 80–100)
MONOCYTES # BLD AUTO: 0.56 X10*3/UL (ref 0.1–1)
MONOCYTES NFR BLD AUTO: 7.3 %
NEUTROPHILS # BLD AUTO: 5.25 X10*3/UL (ref 1.2–7.7)
NEUTROPHILS NFR BLD AUTO: 68.3 %
NON HDL CHOLESTEROL: 159 MG/DL (ref 0–149)
NRBC BLD-RTO: 0 /100 WBCS (ref 0–0)
PLATELET # BLD AUTO: 211 X10*3/UL (ref 150–450)
POTASSIUM SERPL-SCNC: 4.1 MMOL/L (ref 3.5–5.3)
PROT SERPL-MCNC: 6.7 G/DL (ref 6.4–8.2)
PSA SERPL-MCNC: 0.47 NG/ML
RBC # BLD AUTO: 4.41 X10*6/UL (ref 4.5–5.9)
SODIUM SERPL-SCNC: 141 MMOL/L (ref 136–145)
TRIGL SERPL-MCNC: 142 MG/DL (ref 0–149)
TSH SERPL-ACNC: 2.32 MIU/L (ref 0.44–3.98)
VLDL: 28 MG/DL (ref 0–40)
WBC # BLD AUTO: 7.7 X10*3/UL (ref 4.4–11.3)

## 2024-05-28 PROCEDURE — 84402 ASSAY OF FREE TESTOSTERONE: CPT

## 2024-05-28 PROCEDURE — 83550 IRON BINDING TEST: CPT

## 2024-05-28 PROCEDURE — 36415 COLL VENOUS BLD VENIPUNCTURE: CPT

## 2024-05-28 PROCEDURE — 84443 ASSAY THYROID STIM HORMONE: CPT

## 2024-05-28 PROCEDURE — 80053 COMPREHEN METABOLIC PANEL: CPT

## 2024-05-28 PROCEDURE — 82728 ASSAY OF FERRITIN: CPT

## 2024-05-28 PROCEDURE — 85025 COMPLETE CBC W/AUTO DIFF WBC: CPT

## 2024-05-28 PROCEDURE — 80061 LIPID PANEL: CPT

## 2024-05-28 PROCEDURE — 83540 ASSAY OF IRON: CPT

## 2024-05-28 PROCEDURE — 82607 VITAMIN B-12: CPT

## 2024-05-28 PROCEDURE — 84153 ASSAY OF PSA TOTAL: CPT

## 2024-05-28 PROCEDURE — 84466 ASSAY OF TRANSFERRIN: CPT

## 2024-06-01 DIAGNOSIS — D64.9 ANEMIA, UNSPECIFIED TYPE: Primary | ICD-10-CM

## 2024-06-01 LAB
FERRITIN SERPL-MCNC: 141 NG/ML (ref 20–300)
IRON SATN MFR SERPL: 32 % (ref 25–45)
IRON SERPL-MCNC: 109 UG/DL (ref 35–150)
TESTOSTERONE FREE (CHAN): 122.5 PG/ML (ref 35–155)
TESTOSTERONE,TOTAL,LC-MS/MS: 700 NG/DL (ref 250–1100)
TIBC SERPL-MCNC: 339 UG/DL (ref 240–445)
TRANSFERRIN SERPL-MCNC: 232 MG/DL (ref 200–360)
UIBC SERPL-MCNC: 230 UG/DL (ref 110–370)
VIT B12 SERPL-MCNC: 543 PG/ML (ref 211–911)

## 2024-06-04 ENCOUNTER — TELEPHONE (OUTPATIENT)
Dept: PRIMARY CARE | Facility: HOSPITAL | Age: 54
End: 2024-06-04
Payer: COMMERCIAL

## 2024-06-04 NOTE — TELEPHONE ENCOUNTER
Patient called he has question concerning his Low red blood count.  Please call patient .[phone]   Thank you

## 2024-06-04 NOTE — PATIENT COMMUNICATION
Cough improving, still with some fatigue, legs get hot at night (no sweats).  Blood counts have not diminished significantly over past 6 yrs. ? May be an effect of recent illness.  Will repeat CBC in 2 weeks.  If counts are still  low will refer to heme.  Lawrence is in agreement

## 2024-07-17 ENCOUNTER — APPOINTMENT (OUTPATIENT)
Dept: PRIMARY CARE | Facility: CLINIC | Age: 54
End: 2024-07-17
Payer: COMMERCIAL

## 2024-07-17 ENCOUNTER — LAB (OUTPATIENT)
Dept: LAB | Facility: LAB | Age: 54
End: 2024-07-17
Payer: COMMERCIAL

## 2024-07-17 VITALS
WEIGHT: 167.2 LBS | HEART RATE: 67 BPM | DIASTOLIC BLOOD PRESSURE: 76 MMHG | TEMPERATURE: 98.2 F | BODY MASS INDEX: 24.69 KG/M2 | OXYGEN SATURATION: 99 % | SYSTOLIC BLOOD PRESSURE: 122 MMHG

## 2024-07-17 DIAGNOSIS — G89.29 CHRONIC BILATERAL LOW BACK PAIN WITHOUT SCIATICA: ICD-10-CM

## 2024-07-17 DIAGNOSIS — M54.50 CHRONIC BILATERAL LOW BACK PAIN WITHOUT SCIATICA: ICD-10-CM

## 2024-07-17 DIAGNOSIS — D64.9 ANEMIA, UNSPECIFIED TYPE: ICD-10-CM

## 2024-07-17 DIAGNOSIS — M54.2 NECK PAIN: ICD-10-CM

## 2024-07-17 DIAGNOSIS — R53.83 OTHER FATIGUE: ICD-10-CM

## 2024-07-17 DIAGNOSIS — R35.1 NOCTURIA: ICD-10-CM

## 2024-07-17 DIAGNOSIS — D64.9 ANEMIA, UNSPECIFIED TYPE: Primary | ICD-10-CM

## 2024-07-17 DIAGNOSIS — M79.10 MYALGIA: ICD-10-CM

## 2024-07-17 LAB
ALBUMIN SERPL BCP-MCNC: 4.6 G/DL (ref 3.4–5)
ALP SERPL-CCNC: 53 U/L (ref 33–120)
ALT SERPL W P-5'-P-CCNC: 19 U/L (ref 10–52)
ANION GAP SERPL CALC-SCNC: 13 MMOL/L (ref 10–20)
APPEARANCE UR: CLEAR
AST SERPL W P-5'-P-CCNC: 22 U/L (ref 9–39)
BASOPHILS # BLD AUTO: 0.02 X10*3/UL (ref 0–0.1)
BASOPHILS NFR BLD AUTO: 0.3 %
BILIRUB SERPL-MCNC: 0.7 MG/DL (ref 0–1.2)
BILIRUB UR STRIP.AUTO-MCNC: NEGATIVE MG/DL
BUN SERPL-MCNC: 13 MG/DL (ref 6–23)
CALCIUM SERPL-MCNC: 9.9 MG/DL (ref 8.6–10.3)
CHLORIDE SERPL-SCNC: 102 MMOL/L (ref 98–107)
CO2 SERPL-SCNC: 29 MMOL/L (ref 21–32)
COLOR UR: NORMAL
CREAT SERPL-MCNC: 0.88 MG/DL (ref 0.5–1.3)
EGFRCR SERPLBLD CKD-EPI 2021: >90 ML/MIN/1.73M*2
EOSINOPHIL # BLD AUTO: 0.02 X10*3/UL (ref 0–0.7)
EOSINOPHIL NFR BLD AUTO: 0.3 %
ERYTHROCYTE [DISTWIDTH] IN BLOOD BY AUTOMATED COUNT: 12.9 % (ref 11.5–14.5)
ERYTHROCYTE [SEDIMENTATION RATE] IN BLOOD BY WESTERGREN METHOD: 3 MM/H (ref 0–20)
GLUCOSE SERPL-MCNC: 88 MG/DL (ref 74–99)
GLUCOSE UR STRIP.AUTO-MCNC: NORMAL MG/DL
HCT VFR BLD AUTO: 42.9 % (ref 41–52)
HGB BLD-MCNC: 14 G/DL (ref 13.5–17.5)
IMM GRANULOCYTES # BLD AUTO: 0.03 X10*3/UL (ref 0–0.7)
IMM GRANULOCYTES NFR BLD AUTO: 0.5 % (ref 0–0.9)
KETONES UR STRIP.AUTO-MCNC: NEGATIVE MG/DL
LEUKOCYTE ESTERASE UR QL STRIP.AUTO: NEGATIVE
LYMPHOCYTES # BLD AUTO: 1.04 X10*3/UL (ref 1.2–4.8)
LYMPHOCYTES NFR BLD AUTO: 18.1 %
MCH RBC QN AUTO: 29.9 PG (ref 26–34)
MCHC RBC AUTO-ENTMCNC: 32.6 G/DL (ref 32–36)
MCV RBC AUTO: 92 FL (ref 80–100)
MONOCYTES # BLD AUTO: 0.67 X10*3/UL (ref 0.1–1)
MONOCYTES NFR BLD AUTO: 11.7 %
NEUTROPHILS # BLD AUTO: 3.97 X10*3/UL (ref 1.2–7.7)
NEUTROPHILS NFR BLD AUTO: 69.1 %
NITRITE UR QL STRIP.AUTO: NEGATIVE
NRBC BLD-RTO: 0 /100 WBCS (ref 0–0)
PH UR STRIP.AUTO: 6 [PH]
PLATELET # BLD AUTO: 220 X10*3/UL (ref 150–450)
POTASSIUM SERPL-SCNC: 4.5 MMOL/L (ref 3.5–5.3)
PROT SERPL-MCNC: 7.1 G/DL (ref 6.4–8.2)
PROT SERPL-MCNC: 7.5 G/DL (ref 6.4–8.2)
PROT UR STRIP.AUTO-MCNC: NEGATIVE MG/DL
PROT UR-ACNC: 4 MG/DL (ref 5–25)
RBC # BLD AUTO: 4.69 X10*6/UL (ref 4.5–5.9)
RBC # UR STRIP.AUTO: NEGATIVE /UL
RHEUMATOID FACT SER NEPH-ACNC: <10 IU/ML (ref 0–15)
SODIUM SERPL-SCNC: 139 MMOL/L (ref 136–145)
SP GR UR STRIP.AUTO: 1.01
UROBILINOGEN UR STRIP.AUTO-MCNC: NORMAL MG/DL
WBC # BLD AUTO: 5.8 X10*3/UL (ref 4.4–11.3)

## 2024-07-17 PROCEDURE — 81003 URINALYSIS AUTO W/O SCOPE: CPT

## 2024-07-17 PROCEDURE — 36415 COLL VENOUS BLD VENIPUNCTURE: CPT

## 2024-07-17 PROCEDURE — 84166 PROTEIN E-PHORESIS/URINE/CSF: CPT

## 2024-07-17 PROCEDURE — 86431 RHEUMATOID FACTOR QUANT: CPT

## 2024-07-17 PROCEDURE — 84165 PROTEIN E-PHORESIS SERUM: CPT

## 2024-07-17 PROCEDURE — 84156 ASSAY OF PROTEIN URINE: CPT

## 2024-07-17 PROCEDURE — 85025 COMPLETE CBC W/AUTO DIFF WBC: CPT

## 2024-07-17 PROCEDURE — 84155 ASSAY OF PROTEIN SERUM: CPT

## 2024-07-17 PROCEDURE — 86038 ANTINUCLEAR ANTIBODIES: CPT

## 2024-07-17 PROCEDURE — 85652 RBC SED RATE AUTOMATED: CPT

## 2024-07-17 PROCEDURE — 80053 COMPREHEN METABOLIC PANEL: CPT

## 2024-07-17 PROCEDURE — 83521 IG LIGHT CHAINS FREE EACH: CPT

## 2024-07-17 RX ORDER — DIAZEPAM 10 MG/1
10 TABLET ORAL EVERY 8 HOURS PRN
Qty: 12 TABLET | Refills: 0 | Status: SHIPPED | OUTPATIENT
Start: 2024-07-17

## 2024-07-17 NOTE — PROGRESS NOTES
Chief Complaint:   Follow-up (Blood work, sore throat,stuffy, body aches)     History Of Present Illness:    Lawrence Grier is a 53 y.o. male with active medical problems outlined below who presents for follow up of anemia and sore throat.    4/17/24 Initial Visit   Lawrence started finasteride 3 years ago.  He initiated the medication for hair loss, and seems to be working.  He is now experiencing symptoms that may be related to the medication.  He endorses fatigue and feels very washed out by the end of the day.  He is using muscle mass and gaining weight on his midsection.  His libido is down, and is more difficult for him to maintain a rigid erection.  He has not had a testosterone level assessed recently.    He has a history of multiple pulmonary emboli.  The first was in 2013, and he was diagnosed with multiple bilateral pulmonary emboli on 12/20/2023.  He is now on Eliquis 5 mg twice daily.  He was evaluated by cardiologist and a vascular specialist.  It is not clear if he met with a hematologist or had a complete hypercoagulability workup.  He does not have any family history of blood clots.  He is a runner and does a 5 mile run 3 to 4 days/week.  He denies chest pain or shortness of breath with physical exertion.  He completed a stress test on 8/5/2022 that showed preserved LV function and no ischemia.  Appetite is normal he denies any change in bowel habits.  He denies any new urinary symptoms.    1/18/24  Echocardiogram - EF 65%    Stress test 8/25/23  Summary:  1. The resting ejection fraction was estimated at 60 to 65% with a peak exercise ejection fraction estimated at 65 to 70%.  2. Normal global left ventricular systolic function.  3. Adequate level of stress achieved.  4. No electrocardiographic, clinical or echocardiographic evidence for ischemia at a maximal workload.  5. No ischemia by ECG at max workload.  6. Normal Stress Test.    CT 12/20/23  Multiple PE     INTERVAL HISTORY 7/17/24 Saturday  "night not feeling well - woke up Sunday am with congestion, achy, neck and back very sore   Feels \"like I've aged 30 years\" in the past few months  Joint pain, legs feel hot - not numb, no tingling.  Not hot to touch when he or his wife palpates his legs.   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  1/18/24 - vascular US - no dvt, chronic changes in left leg        Patient Active Problem List   Diagnosis    Pulmonary embolus (Multi)    Androgenic alopecia    Factor V Leiden (Multi)    Routine health maintenance    Other fatigue    Respiratory infection    Anemia    Nocturia    Myalgia    Neck pain    Chronic bilateral low back pain without sciatica       Current Outpatient Medications:     albuterol 90 mcg/actuation inhaler, Inhale 2 puffs every 6 hours if needed for wheezing., Disp: 18 g, Rfl: 1    apixaban (Eliquis) 5 mg tablet, Take 1 tablet (5 mg) by mouth 2 times a day., Disp: 180 tablet, Rfl: 3    finasteride (Propecia) 1 mg tablet, TAKE ONE TABLET BY MOUTH ONCE DAILY, Disp: 90 tablet, Rfl: 3    tadalafil (Cialis) 2.5 mg tablet, Take 1 tablet (2.5 mg) by mouth once daily., Disp: , Rfl:     celecoxib (CeleBREX) 200 mg capsule, Take 1 capsule (200 mg) by mouth 2 times a day., Disp: , Rfl:     diazePAM (Valium) 10 mg tablet, Take 1 tablet (10 mg) by mouth every 8 hours if needed for anxiety. Take 30 min prior to flight., Disp: 12 tablet, Rfl: 0  Patient has no known allergies.  Social History     Tobacco Use    Smoking status: Never    Smokeless tobacco: Never   Vaping Use    Vaping status: Never Used   Substance Use Topics    Alcohol use: Yes     Alcohol/week: 7.0 standard drinks of alcohol     Types: 7 Cans of beer per week     Comment: 1 beer per day    Drug use: Never         All pertinent aspects of medical and " surgical history were reviewed and updated in chart    Review of Systems   Pertinent positives in review of systems outlined above.  Complete ROS otherwise negative.        Last Recorded Vitals:  No data found.         Physical Exam  HENT:      Mouth/Throat:      Pharynx: Oropharynx is clear.   Eyes:      Extraocular Movements: Extraocular movements intact.      Conjunctiva/sclera: Conjunctivae normal.      Pupils: Pupils are equal, round, and reactive to light.   Cardiovascular:      Rate and Rhythm: Normal rate and regular rhythm.      Pulses: Normal pulses.      Heart sounds: Normal heart sounds.   Pulmonary:      Effort: Pulmonary effort is normal.      Breath sounds: Normal breath sounds.   Musculoskeletal:      Right lower leg: No edema.      Left lower leg: No edema.      Comments: 2+ DP bilateral.             The 10-year ASCVD risk score (Marcia MALCOLM, et al., 2019) is: 4.1%    Values used to calculate the score:      Age: 53 years      Sex: Male      Is Non- : No      Diabetic: No      Tobacco smoker: No      Systolic Blood Pressure: 122 mmHg      Is BP treated: No      HDL Cholesterol: 59.1 mg/dL      Total Cholesterol: 218 mg/dL        Assessment/Plan   Problem List Items Addressed This Visit       Other fatigue     Recent TSH and TT normal. Will repeat CBC now.         Relevant Orders    Comprehensive Metabolic Panel (Completed)    CBC and Auto Differential (Completed)    Anemia - Primary     Normocytic anemia present on labs in May.  Iron studies and B12 normal.  Will repeat CBC and check SPEP/UPEP, serum free light chains.           Relevant Orders    Comprehensive Metabolic Panel (Completed)    CBC and Auto Differential (Completed)    Serum Protein Electrophoresis (Completed)    Urine Protein Electrophoresis (Completed)    [place]/Lambda Free Light Chain, Serum (Completed)    Nocturia     Recent PSA low, no suggestive of significant prostatomegaly.  Will check UA          Relevant  Orders    Comprehensive Metabolic Panel (Completed)    Urinalysis with Reflex Microscopic (Completed)    Myalgia     Will check ESR, JAVIER, RF          Relevant Orders    Comprehensive Metabolic Panel (Completed)    Rheumatoid Factor (Completed)    Sedimentation Rate (Completed)    JAVIER with Reflex to SARI    Neck pain     May be DDD in neck.  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  Will check plain films and consider EMG/NCV          Relevant Orders    XR lumbar spine 2-3 views       A total of 30 minutes was spent reviewing the chart and recent testing and discussing plan of care.         Zohaib Coffey MD

## 2024-07-18 ENCOUNTER — PATIENT MESSAGE (OUTPATIENT)
Dept: PRIMARY CARE | Facility: CLINIC | Age: 54
End: 2024-07-18
Payer: COMMERCIAL

## 2024-07-18 LAB
ALBUMIN MFR UR ELPH: 27.9 %
ALBUMIN: 4.4 G/DL (ref 3.4–5)
ALPHA 1 GLOBULIN: 0.3 G/DL (ref 0.2–0.6)
ALPHA 2 GLOBULIN: 0.8 G/DL (ref 0.4–1.1)
ALPHA1 GLOB MFR UR ELPH: 13.2 %
ALPHA2 GLOB MFR UR ELPH: 24.6 %
B-GLOBULIN MFR UR ELPH: 19.7 %
BETA GLOBULIN: 0.8 G/DL (ref 0.5–1.2)
GAMMA GLOB MFR UR ELPH: 14.6 %
GAMMA GLOBULIN: 1.1 G/DL (ref 0.5–1.4)
KAPPA LC SERPL-MCNC: 2.13 MG/DL (ref 0.33–1.94)
KAPPA LC/LAMBDA SER: 1.15 {RATIO} (ref 0.26–1.65)
LAMBDA LC SERPL-MCNC: 1.86 MG/DL (ref 0.57–2.63)
PATH REVIEW-SERUM PROTEIN ELECTROPHORESIS: NORMAL
PATH REVIEW-URINE PROTEIN ELECTROPHORESIS: NORMAL
PROTEIN ELECTROPHORESIS COMMENT: NORMAL
URINE ELECTROPHORESIS COMMENT: NORMAL

## 2024-07-19 ENCOUNTER — TELEPHONE (OUTPATIENT)
Dept: PRIMARY CARE | Facility: CLINIC | Age: 54
End: 2024-07-19
Payer: COMMERCIAL

## 2024-07-19 DIAGNOSIS — R76.8 ELEVATED SERUM IMMUNOGLOBULIN FREE LIGHT CHAINS: Primary | ICD-10-CM

## 2024-07-19 DIAGNOSIS — R20.8 DYSESTHESIA: ICD-10-CM

## 2024-07-19 PROBLEM — M54.50 CHRONIC BILATERAL LOW BACK PAIN WITHOUT SCIATICA: Status: ACTIVE | Noted: 2024-07-19

## 2024-07-19 PROBLEM — D64.9 ANEMIA: Status: ACTIVE | Noted: 2024-07-19

## 2024-07-19 PROBLEM — R35.1 NOCTURIA: Status: ACTIVE | Noted: 2024-07-19

## 2024-07-19 PROBLEM — M54.2 NECK PAIN: Status: ACTIVE | Noted: 2024-07-19

## 2024-07-19 PROBLEM — M79.10 MYALGIA: Status: ACTIVE | Noted: 2024-07-19

## 2024-07-19 PROBLEM — G89.29 CHRONIC BILATERAL LOW BACK PAIN WITHOUT SCIATICA: Status: ACTIVE | Noted: 2024-07-19

## 2024-07-19 LAB — ANA SER QL HEP2 SUBST: NEGATIVE

## 2024-07-19 NOTE — ASSESSMENT & PLAN NOTE
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  Will check plain films and consider EMG/NCV

## 2024-07-19 NOTE — ASSESSMENT & PLAN NOTE
Normocytic anemia present on labs in May.  Iron studies and B12 normal.  Will repeat CBC and check SPEP/UPEP, serum free light chains.

## 2024-07-22 NOTE — TELEPHONE ENCOUNTER
I spoke with Lawrence and reviewed his testing  Slight increase in light chains is of unclear significance given that kappa lambda ratio is intact.  I referred him to hematology.    Still having dysesthesia in legs and EMG ordered.

## 2024-08-21 ENCOUNTER — APPOINTMENT (OUTPATIENT)
Dept: PRIMARY CARE | Facility: CLINIC | Age: 54
End: 2024-08-21
Payer: COMMERCIAL

## 2024-09-04 ENCOUNTER — LAB (OUTPATIENT)
Dept: LAB | Facility: CLINIC | Age: 54
End: 2024-09-04
Payer: COMMERCIAL

## 2024-09-04 ENCOUNTER — OFFICE VISIT (OUTPATIENT)
Dept: HEMATOLOGY/ONCOLOGY | Facility: CLINIC | Age: 54
End: 2024-09-04
Payer: COMMERCIAL

## 2024-09-04 VITALS
SYSTOLIC BLOOD PRESSURE: 123 MMHG | BODY MASS INDEX: 24.25 KG/M2 | RESPIRATION RATE: 18 BRPM | OXYGEN SATURATION: 99 % | DIASTOLIC BLOOD PRESSURE: 80 MMHG | HEART RATE: 56 BPM | TEMPERATURE: 97.2 F | WEIGHT: 164.24 LBS

## 2024-09-04 DIAGNOSIS — R76.8 ELEVATED SERUM IMMUNOGLOBULIN FREE LIGHT CHAINS: ICD-10-CM

## 2024-09-04 LAB
FOLATE SERPL-MCNC: >24 NG/ML
HOLD SPECIMEN: NORMAL
TSH SERPL-ACNC: 2.39 MIU/L (ref 0.44–3.98)
VIT B12 SERPL-MCNC: 591 PG/ML (ref 211–911)

## 2024-09-04 PROCEDURE — 82746 ASSAY OF FOLIC ACID SERUM: CPT | Mod: PARLAB

## 2024-09-04 PROCEDURE — 99215 OFFICE O/P EST HI 40 MIN: CPT | Performed by: INTERNAL MEDICINE

## 2024-09-04 PROCEDURE — 36415 COLL VENOUS BLD VENIPUNCTURE: CPT | Performed by: INTERNAL MEDICINE

## 2024-09-04 PROCEDURE — 84443 ASSAY THYROID STIM HORMONE: CPT

## 2024-09-04 PROCEDURE — 82607 VITAMIN B-12: CPT | Mod: PARLAB

## 2024-09-04 PROCEDURE — 1036F TOBACCO NON-USER: CPT | Performed by: INTERNAL MEDICINE

## 2024-09-04 PROCEDURE — 99205 OFFICE O/P NEW HI 60 MIN: CPT | Performed by: INTERNAL MEDICINE

## 2024-09-04 ASSESSMENT — PATIENT HEALTH QUESTIONNAIRE - PHQ9
1. LITTLE INTEREST OR PLEASURE IN DOING THINGS: NOT AT ALL
2. FEELING DOWN, DEPRESSED OR HOPELESS: NOT AT ALL
SUM OF ALL RESPONSES TO PHQ9 QUESTIONS 1 AND 2: 0

## 2024-09-04 ASSESSMENT — PAIN SCALES - GENERAL: PAINLEVEL: 0-NO PAIN

## 2024-09-04 ASSESSMENT — COLUMBIA-SUICIDE SEVERITY RATING SCALE - C-SSRS
2. HAVE YOU ACTUALLY HAD ANY THOUGHTS OF KILLING YOURSELF?: NO
6. HAVE YOU EVER DONE ANYTHING, STARTED TO DO ANYTHING, OR PREPARED TO DO ANYTHING TO END YOUR LIFE?: NO
1. IN THE PAST MONTH, HAVE YOU WISHED YOU WERE DEAD OR WISHED YOU COULD GO TO SLEEP AND NOT WAKE UP?: NO

## 2024-09-04 NOTE — PROGRESS NOTES
Patient ID: Lawrence Grier is a 54 y.o. male.  Referring Physician: Zohaib Coffey MD  [address] [place], [facility] [address]  [place] [address]  Primary Care Provider: Zohaib Coffey MD  Visit Type: Initial Visit  The patient was referred to me for further evaluation and management of mildly elevated free kappa light chains at 2.14 mg/dL reference range 0.33-1.94 mg/dL Subjective    HPI  The patient is a 54-year-old man was evaluated by Dr. Coffey,PCP.  The patient had presented with longstanding history of low back pain and since last few months has noticed burning sensation in thighs as well as arms.  Somewhat so that the patient has to put a pillow between the 2 legs as well as very short at night so the skin does not drop to experience the symptoms.  Extensively evaluated.  CBC on July 17, 2024 revealed WBC 5.8, hemoglobin 14 g/dL, platelets 20 20,000/mm³.  Differential count revealed 69% neutrophils 18% lymphocytes 12% monocytes.  Calcium, BUN, creatinine all in reference range.  The patient was referred to hematology services as well as neurology services for possible EMG nerve conduction studies.    At interview on September 4, 2024 the patient narrated entire history.  Denied history of weight loss, fevers, night sweats, chest pain, shortness of breath, nausea, vomiting, hematemesis, melena, hematochezia and hematuria.    Past medical history pulmonary emboli in 2013 and in 2023.  Evaluation revealed factor V Leiden mutation.  Receiving long-term anticoagulation using Eliquis.    BRCA2 positive.,  Androgenic alopecia,.    Past surgical history:    Reviewed but not relevant.    Colonoscopy:    May 2022.    Family history:    Mother had breast cancer.      Review of Systems   All other systems reviewed and are negative.       Objective   BSA: 1.9 meters squared  /80   Pulse 56   Temp 36.2 °C (97.2 °F)   Resp 18   Wt 74.5 kg (164 lb 3.9 oz)   SpO2 99%   BMI 24.25 kg/m²      has a past  medical history of Androgenic alopecia, Factor V Leiden (Multi), and Pulmonary embolus (Multi).   has a past surgical history that includes Lumbar epidural injection.  Family History   Problem Relation Name Age of Onset    Breast cancer Mother Darleen Grier     No Known Problems Father      No Known Problems Sister      No Known Problems Sister      No Known Problems Daughter          Jr in College    No Known Problems Son      No Known Problems Son          Medical school - [employer]     Oncology History    No history exists.       Lawrence Grier  reports that he has never smoked. He has never used smokeless tobacco.  He  reports current alcohol use of about 7.0 standard drinks of alcohol per week.  He  reports no history of drug use.    Physical Exam  Constitutional:       Appearance: Normal appearance.   HENT:      Head: Normocephalic and atraumatic.      Nose: Nose normal.      Mouth/Throat:      Mouth: Mucous membranes are moist.      Pharynx: Oropharynx is clear.   Eyes:      Extraocular Movements: Extraocular movements intact.      Conjunctiva/sclera: Conjunctivae normal.      Pupils: Pupils are equal, round, and reactive to light.   Cardiovascular:      Rate and Rhythm: Normal rate and regular rhythm.   Pulmonary:      Effort: Pulmonary effort is normal.      Breath sounds: Normal breath sounds.   Abdominal:      General: Abdomen is flat. Bowel sounds are normal.      Palpations: Abdomen is soft.   Musculoskeletal:         General: Normal range of motion.      Cervical back: Normal range of motion and neck supple.   Neurological:      General: No focal deficit present.      Mental Status: He is alert and oriented to person, place, and time. Mental status is at baseline.   Psychiatric:         Mood and Affect: Mood normal.         Behavior: Behavior normal.         Thought Content: Thought content normal.         Judgment: Judgment normal.         WBC   Date/Time Value Ref Range Status   07/17/2024 09:11 AM 5.8 4.4 -  11.3 x10*3/uL Final   05/28/2024 07:32 AM 7.7 4.4 - 11.3 x10*3/uL Final   08/13/2023 02:20 PM 6.6 4.4 - 11.3 x10E9/L Final   04/02/2021 02:25 PM 6.0 4.4 - 11.3 x10E9/L Final   02/06/2021 08:29 AM 5.0 4.4 - 11.3 x10E9/L Final     nRBC   Date Value Ref Range Status   07/17/2024 0.0 0.0 - 0.0 /100 WBCs Final   05/28/2024 0.0 0.0 - 0.0 /100 WBCs Final   06/14/2018 0.0 0.0 - 0.0 /100 WBC Final     RBC   Date Value Ref Range Status   07/17/2024 4.69 4.50 - 5.90 x10*6/uL Final   05/28/2024 4.41 (L) 4.50 - 5.90 x10*6/uL Final   08/13/2023 5.00 4.50 - 5.90 x10E12/L Final   04/02/2021 4.57 4.50 - 5.90 x10E12/L Final   02/06/2021 4.42 (L) 4.50 - 5.90 x10E12/L Final     Hemoglobin   Date Value Ref Range Status   07/17/2024 14.0 13.5 - 17.5 g/dL Final   05/28/2024 13.1 (L) 13.5 - 17.5 g/dL Final   08/13/2023 14.9 13.5 - 17.5 g/dL Final   04/02/2021 13.7 13.5 - 17.5 g/dL Final   02/06/2021 13.6 13.5 - 17.5 g/dL Final     Hematocrit   Date Value Ref Range Status   07/17/2024 42.9 41.0 - 52.0 % Final   05/28/2024 40.8 (L) 41.0 - 52.0 % Final   08/13/2023 45.5 41.0 - 52.0 % Final   04/02/2021 41.5 41.0 - 52.0 % Final   02/06/2021 39.5 (L) 41.0 - 52.0 % Final     MCV   Date/Time Value Ref Range Status   07/17/2024 09:11 AM 92 80 - 100 fL Final   05/28/2024 07:32 AM 93 80 - 100 fL Final   08/13/2023 02:20 PM 91 80 - 100 fL Final   04/02/2021 02:25 PM 91 80 - 100 fL Final   02/06/2021 08:29 AM 89 80 - 100 fL Final     MCH   Date/Time Value Ref Range Status   07/17/2024 09:11 AM 29.9 26.0 - 34.0 pg Final   05/28/2024 07:32 AM 29.7 26.0 - 34.0 pg Final     MCHC   Date/Time Value Ref Range Status   07/17/2024 09:11 AM 32.6 32.0 - 36.0 g/dL Final   05/28/2024 07:32 AM 32.1 32.0 - 36.0 g/dL Final   08/13/2023 02:20 PM 32.7 32.0 - 36.0 g/dL Final   04/02/2021 02:25 PM 33.0 32.0 - 36.0 g/dL Final   02/06/2021 08:29 AM 34.4 32.0 - 36.0 g/dL Final     RDW   Date/Time Value Ref Range Status   07/17/2024 09:11 AM 12.9 11.5 - 14.5 % Final  "  05/28/2024 07:32 AM 12.6 11.5 - 14.5 % Final   08/13/2023 02:20 PM 12.9 11.5 - 14.5 % Final   04/02/2021 02:25 PM 12.7 11.5 - 14.5 % Final   02/06/2021 08:29 AM 12.5 11.5 - 14.5 % Final     Platelets   Date/Time Value Ref Range Status   07/17/2024 09:11  150 - 450 x10*3/uL Final   05/28/2024 07:32  150 - 450 x10*3/uL Final   08/13/2023 02:20  150 - 450 x10E9/L Final   04/02/2021 02:25  150 - 450 x10E9/L Final   02/06/2021 08:29  150 - 450 x10E9/L Final     No results found for: \"MPV\"  Neutrophils %   Date/Time Value Ref Range Status   07/17/2024 09:11 AM 69.1 40.0 - 80.0 % Final   05/28/2024 07:32 AM 68.3 40.0 - 80.0 % Final   08/13/2023 02:20 PM 73.8 40.0 - 80.0 % Final   04/02/2021 02:25 PM 66.0 40.0 - 80.0 % Final   02/06/2021 08:29 AM 58.0 40.0 - 80.0 % Final     Immature Granulocytes %, Automated   Date/Time Value Ref Range Status   07/17/2024 09:11 AM 0.5 0.0 - 0.9 % Final     Comment:     Immature Granulocyte Count (IG) includes promyelocytes, myelocytes and metamyelocytes but does not include bands. Percent differential counts (%) should be interpreted in the context of the absolute cell counts (cells/UL).   05/28/2024 07:32 AM 0.1 0.0 - 0.9 % Final     Comment:     Immature Granulocyte Count (IG) includes promyelocytes, myelocytes and metamyelocytes but does not include bands. Percent differential counts (%) should be interpreted in the context of the absolute cell counts (cells/UL).   08/13/2023 02:20 PM 0.3 0.0 - 0.9 % Final     Comment:      Immature Granulocyte Count (IG) includes promyelocytes,    myelocytes and metamyelocytes but does not include bands.   Percent differential counts (%) should be interpreted in the   context of the absolute cell counts (cells/L).     04/02/2021 02:25 PM 0.2 0.0 - 0.9 % Final     Comment:      Immature Granulocyte Count (IG) includes promyelocytes,    myelocytes and metamyelocytes but does not include bands.   Percent differential counts " (%) should be interpreted in the   context of the absolute cell counts (cells/L).     02/06/2021 08:29 AM 0.2 0.0 - 0.9 % Final     Comment:      Immature Granulocyte Count (IG) includes promyelocytes,    myelocytes and metamyelocytes but does not include bands.   Percent differential counts (%) should be interpreted in the   context of the absolute cell counts (cells/L).       Lymphocytes %   Date/Time Value Ref Range Status   07/17/2024 09:11 AM 18.1 13.0 - 44.0 % Final   05/28/2024 07:32 AM 23.0 13.0 - 44.0 % Final   08/13/2023 02:20 PM 19.6 13.0 - 44.0 % Final   04/02/2021 02:25 PM 26.4 13.0 - 44.0 % Final   02/06/2021 08:29 AM 31.3 13.0 - 44.0 % Final     Monocytes %   Date/Time Value Ref Range Status   07/17/2024 09:11 AM 11.7 2.0 - 10.0 % Final   05/28/2024 07:32 AM 7.3 2.0 - 10.0 % Final   08/13/2023 02:20 PM 5.8 2.0 - 10.0 % Final   04/02/2021 02:25 PM 6.6 2.0 - 10.0 % Final   02/06/2021 08:29 AM 8.1 2.0 - 10.0 % Final     Eosinophils %   Date/Time Value Ref Range Status   07/17/2024 09:11 AM 0.3 0.0 - 6.0 % Final   05/28/2024 07:32 AM 0.9 0.0 - 6.0 % Final   08/13/2023 02:20 PM 0.2 0.0 - 6.0 % Final   04/02/2021 02:25 PM 0.5 0.0 - 6.0 % Final   02/06/2021 08:29 AM 1.8 0.0 - 6.0 % Final     Basophils %   Date/Time Value Ref Range Status   07/17/2024 09:11 AM 0.3 0.0 - 2.0 % Final   05/28/2024 07:32 AM 0.4 0.0 - 2.0 % Final   08/13/2023 02:20 PM 0.3 0.0 - 2.0 % Final   04/02/2021 02:25 PM 0.3 0.0 - 2.0 % Final   02/06/2021 08:29 AM 0.6 0.0 - 2.0 % Final     Neutrophils Absolute   Date/Time Value Ref Range Status   07/17/2024 09:11 AM 3.97 1.20 - 7.70 x10*3/uL Final     Comment:     Percent differential counts (%) should be interpreted in the context of the absolute cell counts (cells/uL).   05/28/2024 07:32 AM 5.25 1.20 - 7.70 x10*3/uL Final     Comment:     Percent differential counts (%) should be interpreted in the context of the absolute cell counts (cells/uL).   08/13/2023 02:20 PM 4.85 1.20 - 7.70  "x10E9/L Final   04/02/2021 02:25 PM 3.98 1.20 - 7.70 x10E9/L Final   02/06/2021 08:29 AM 2.87 1.20 - 7.70 x10E9/L Final     Immature Granulocytes Absolute, Automated   Date/Time Value Ref Range Status   07/17/2024 09:11 AM 0.03 0.00 - 0.70 x10*3/uL Final   05/28/2024 07:32 AM 0.01 0.00 - 0.70 x10*3/uL Final     Lymphocytes Absolute   Date/Time Value Ref Range Status   07/17/2024 09:11 AM 1.04 (L) 1.20 - 4.80 x10*3/uL Final   05/28/2024 07:32 AM 1.77 1.20 - 4.80 x10*3/uL Final   08/13/2023 02:20 PM 1.29 1.20 - 4.80 x10E9/L Final   04/02/2021 02:25 PM 1.59 1.20 - 4.80 x10E9/L Final   02/06/2021 08:29 AM 1.55 1.20 - 4.80 x10E9/L Final     Monocytes Absolute   Date/Time Value Ref Range Status   07/17/2024 09:11 AM 0.67 0.10 - 1.00 x10*3/uL Final   05/28/2024 07:32 AM 0.56 0.10 - 1.00 x10*3/uL Final   08/13/2023 02:20 PM 0.38 0.10 - 1.00 x10E9/L Final   04/02/2021 02:25 PM 0.40 0.10 - 1.00 x10E9/L Final   02/06/2021 08:29 AM 0.40 0.10 - 1.00 x10E9/L Final     Eosinophils Absolute   Date/Time Value Ref Range Status   07/17/2024 09:11 AM 0.02 0.00 - 0.70 x10*3/uL Final   05/28/2024 07:32 AM 0.07 0.00 - 0.70 x10*3/uL Final   08/13/2023 02:20 PM 0.01 0.00 - 0.70 x10E9/L Final   04/02/2021 02:25 PM 0.03 0.00 - 0.70 x10E9/L Final   02/06/2021 08:29 AM 0.09 0.00 - 0.70 x10E9/L Final     Basophils Absolute   Date/Time Value Ref Range Status   07/17/2024 09:11 AM 0.02 0.00 - 0.10 x10*3/uL Final   05/28/2024 07:32 AM 0.03 0.00 - 0.10 x10*3/uL Final   08/13/2023 02:20 PM 0.02 0.00 - 0.10 x10E9/L Final   04/02/2021 02:25 PM 0.02 0.00 - 0.10 x10E9/L Final   02/06/2021 08:29 AM 0.03 0.00 - 0.10 x10E9/L Final       No components found for: \"PT\"  No results found for: \"APTT\"    Assessment/Plan         Diagnoses and all orders for this visit:  Elevated serum immunoglobulin free light chains  -     Referral to Hematology and Oncology  -     XR skeletal survey complete; Future  -     TSH with reflex to Free T4 if abnormal; Future  -     " Vitamin B12; Future  -     Folate; Future  -     Clinic Appointment Request (review labs and bone survey from [facility]); Future  -     Lavender Top; Future         Elmer Powers MD  The patient is a 54-year-old man with past history of pulmonary emboli, factor V Leiden mutation, BRCA2 mutation positive was referred for further evaluation and management of slightly elevated free kappa light chains at 2.14 mg/dL reference range 0.33-1.94 mg/dL.  Physical examination within normal limits.  I had a detailed discussion with the patient explained to him the significance of mildly elevated free kappa light chains.  Differential diagnosis includes MGUS, light chain multiple myeloma, nonsecretory multiple myeloma.  Serum protein electrophoresis did not reveal M protein.  I have recommended a bone survey.  If there are no osteolytic lesions it is less likely the patient has multiple myeloma.  Incidentally 10 to 15% of patients with M protein/free light chains have peripheral neuropathy.  The patient has an appoint for EMG, nerve conduction studies.  The patient understood appreciated all the details provided and was grateful.    Thank you for allowing me to participate in care of your patient if you have any questions please feel free to call me.

## 2024-09-05 ENCOUNTER — HOSPITAL ENCOUNTER (OUTPATIENT)
Dept: RADIOLOGY | Facility: HOSPITAL | Age: 54
Discharge: HOME | End: 2024-09-05
Payer: COMMERCIAL

## 2024-09-05 DIAGNOSIS — R76.8 ELEVATED SERUM IMMUNOGLOBULIN FREE LIGHT CHAINS: ICD-10-CM

## 2024-09-05 PROCEDURE — 77075 RADEX OSSEOUS SURVEY COMPL: CPT

## 2024-09-09 ENCOUNTER — TELEPHONE (OUTPATIENT)
Dept: HEMATOLOGY/ONCOLOGY | Facility: CLINIC | Age: 54
End: 2024-09-09
Payer: COMMERCIAL

## 2024-09-25 ENCOUNTER — OFFICE VISIT (OUTPATIENT)
Dept: HEMATOLOGY/ONCOLOGY | Facility: CLINIC | Age: 54
End: 2024-09-25
Payer: COMMERCIAL

## 2024-09-25 VITALS
DIASTOLIC BLOOD PRESSURE: 82 MMHG | RESPIRATION RATE: 18 BRPM | SYSTOLIC BLOOD PRESSURE: 124 MMHG | BODY MASS INDEX: 24.29 KG/M2 | TEMPERATURE: 97.2 F | WEIGHT: 164.46 LBS | HEART RATE: 50 BPM | OXYGEN SATURATION: 99 %

## 2024-09-25 DIAGNOSIS — R76.8 ELEVATED SERUM IMMUNOGLOBULIN FREE LIGHT CHAINS: ICD-10-CM

## 2024-09-25 PROCEDURE — 99214 OFFICE O/P EST MOD 30 MIN: CPT | Performed by: INTERNAL MEDICINE

## 2024-09-25 PROCEDURE — 1036F TOBACCO NON-USER: CPT | Performed by: INTERNAL MEDICINE

## 2024-09-25 ASSESSMENT — PAIN SCALES - GENERAL: PAINLEVEL: 0-NO PAIN

## 2024-09-25 NOTE — PROGRESS NOTES
Patient ID: Lawrence Grier is a 54 y.o. male.  Referring Physician: Elmer Powers MD  [address] [facility], [place] [address]  Primary Care Provider: Zohaib Coffey MD  Visit Type: Initial Visit  The patient was referred to me for further evaluation and management of mildly elevated free kappa light chains at 2.14 mg/dL reference range 0.33-1.94 mg/dL  Bone survey on September 5, 2024 did not reveal any evidence of osteolytic lesions suggesting free kappa light chain MGUS.  Subjective    HPI  The patient is a 54-year-old man was evaluated by Dr. Coffey,PCP.  The patient had presented with longstanding history of low back pain and since last few months has noticed burning sensation in thighs as well as arms.  Somewhat so that the patient has to put a pillow between the 2 legs as well as very short at night so the skin does not drop to experience the symptoms.  Extensively evaluated.  CBC on July 17, 2024 revealed WBC 5.8, hemoglobin 14 g/dL, platelets 20 20,000/mm³.  Differential count revealed 69% neutrophils 18% lymphocytes 12% monocytes.  Calcium, BUN, creatinine all in reference range.  The patient was referred to hematology services as well as neurology services for possible EMG nerve conduction studies.    At interview on September 4, 2024 the patient narrated entire history.  Denied history of weight loss, fevers, night sweats, chest pain, shortness of breath, nausea, vomiting, hematemesis, melena, hematochezia and hematuria.    The patient had come for follow-up on September 25, 2024 regarding history of elevated free kappa light chains.  The patient has a past history of chronic low back pain.  Anxious to know results of the test performed.    Past medical history pulmonary emboli in 2013 and in 2023.  Evaluation revealed factor V Leiden mutation.  Receiving long-term anticoagulation using Eliquis.    BRCA2 positive.,  Androgenic alopecia,.    Past surgical history:    Reviewed but  not relevant.    Colonoscopy:    May 2022.    Family history:    Mother had breast cancer.      Review of Systems   All other systems reviewed and are negative.       Objective   BSA: 1.91 meters squared  /82   Pulse 50   Temp 36.2 °C (97.2 °F)   Resp 18   Wt 74.6 kg (164 lb 7.4 oz)   SpO2 99%   BMI 24.29 kg/m²      has a past medical history of Androgenic alopecia, Factor V Leiden (Multi), and Pulmonary embolus (Multi).   has a past surgical history that includes Lumbar epidural injection.  Family History   Problem Relation Name Age of Onset    Breast cancer Mother Darleen Grier     No Known Problems Father      No Known Problems Sister      No Known Problems Sister      No Known Problems Daughter          Jr in College    No Known Problems Son      No Known Problems Son          Medical school - [employer]     Oncology History    No history exists.       Lawrence Grier  reports that he has never smoked. He has never used smokeless tobacco.  He  reports current alcohol use of about 7.0 standard drinks of alcohol per week.  He  reports no history of drug use.    Physical Exam  Constitutional:       Appearance: Normal appearance.   HENT:      Head: Normocephalic and atraumatic.      Nose: Nose normal.      Mouth/Throat:      Mouth: Mucous membranes are moist.      Pharynx: Oropharynx is clear.   Eyes:      Extraocular Movements: Extraocular movements intact.      Conjunctiva/sclera: Conjunctivae normal.      Pupils: Pupils are equal, round, and reactive to light.   Cardiovascular:      Rate and Rhythm: Normal rate and regular rhythm.   Pulmonary:      Effort: Pulmonary effort is normal.      Breath sounds: Normal breath sounds.   Abdominal:      General: Abdomen is flat. Bowel sounds are normal.      Palpations: Abdomen is soft.   Musculoskeletal:         General: Normal range of motion.      Cervical back: Normal range of motion and neck supple.   Neurological:      General: No focal deficit present.      Mental  Status: He is alert and oriented to person, place, and time. Mental status is at baseline.   Psychiatric:         Mood and Affect: Mood normal.         Behavior: Behavior normal.         Thought Content: Thought content normal.         Judgment: Judgment normal.         WBC   Date/Time Value Ref Range Status   07/17/2024 09:11 AM 5.8 4.4 - 11.3 x10*3/uL Final   05/28/2024 07:32 AM 7.7 4.4 - 11.3 x10*3/uL Final   08/13/2023 02:20 PM 6.6 4.4 - 11.3 x10E9/L Final   04/02/2021 02:25 PM 6.0 4.4 - 11.3 x10E9/L Final   02/06/2021 08:29 AM 5.0 4.4 - 11.3 x10E9/L Final     nRBC   Date Value Ref Range Status   07/17/2024 0.0 0.0 - 0.0 /100 WBCs Final   05/28/2024 0.0 0.0 - 0.0 /100 WBCs Final   06/14/2018 0.0 0.0 - 0.0 /100 WBC Final     RBC   Date Value Ref Range Status   07/17/2024 4.69 4.50 - 5.90 x10*6/uL Final   05/28/2024 4.41 (L) 4.50 - 5.90 x10*6/uL Final   08/13/2023 5.00 4.50 - 5.90 x10E12/L Final   04/02/2021 4.57 4.50 - 5.90 x10E12/L Final   02/06/2021 4.42 (L) 4.50 - 5.90 x10E12/L Final     Hemoglobin   Date Value Ref Range Status   07/17/2024 14.0 13.5 - 17.5 g/dL Final   05/28/2024 13.1 (L) 13.5 - 17.5 g/dL Final   08/13/2023 14.9 13.5 - 17.5 g/dL Final   04/02/2021 13.7 13.5 - 17.5 g/dL Final   02/06/2021 13.6 13.5 - 17.5 g/dL Final     Hematocrit   Date Value Ref Range Status   07/17/2024 42.9 41.0 - 52.0 % Final   05/28/2024 40.8 (L) 41.0 - 52.0 % Final   08/13/2023 45.5 41.0 - 52.0 % Final   04/02/2021 41.5 41.0 - 52.0 % Final   02/06/2021 39.5 (L) 41.0 - 52.0 % Final     MCV   Date/Time Value Ref Range Status   07/17/2024 09:11 AM 92 80 - 100 fL Final   05/28/2024 07:32 AM 93 80 - 100 fL Final   08/13/2023 02:20 PM 91 80 - 100 fL Final   04/02/2021 02:25 PM 91 80 - 100 fL Final   02/06/2021 08:29 AM 89 80 - 100 fL Final     MCH   Date/Time Value Ref Range Status   07/17/2024 09:11 AM 29.9 26.0 - 34.0 pg Final   05/28/2024 07:32 AM 29.7 26.0 - 34.0 pg Final     MCHC   Date/Time Value Ref Range Status  "  07/17/2024 09:11 AM 32.6 32.0 - 36.0 g/dL Final   05/28/2024 07:32 AM 32.1 32.0 - 36.0 g/dL Final   08/13/2023 02:20 PM 32.7 32.0 - 36.0 g/dL Final   04/02/2021 02:25 PM 33.0 32.0 - 36.0 g/dL Final   02/06/2021 08:29 AM 34.4 32.0 - 36.0 g/dL Final     RDW   Date/Time Value Ref Range Status   07/17/2024 09:11 AM 12.9 11.5 - 14.5 % Final   05/28/2024 07:32 AM 12.6 11.5 - 14.5 % Final   08/13/2023 02:20 PM 12.9 11.5 - 14.5 % Final   04/02/2021 02:25 PM 12.7 11.5 - 14.5 % Final   02/06/2021 08:29 AM 12.5 11.5 - 14.5 % Final     Platelets   Date/Time Value Ref Range Status   07/17/2024 09:11  150 - 450 x10*3/uL Final   05/28/2024 07:32  150 - 450 x10*3/uL Final   08/13/2023 02:20  150 - 450 x10E9/L Final   04/02/2021 02:25  150 - 450 x10E9/L Final   02/06/2021 08:29  150 - 450 x10E9/L Final     No results found for: \"MPV\"  Neutrophils %   Date/Time Value Ref Range Status   07/17/2024 09:11 AM 69.1 40.0 - 80.0 % Final   05/28/2024 07:32 AM 68.3 40.0 - 80.0 % Final   08/13/2023 02:20 PM 73.8 40.0 - 80.0 % Final   04/02/2021 02:25 PM 66.0 40.0 - 80.0 % Final   02/06/2021 08:29 AM 58.0 40.0 - 80.0 % Final     Immature Granulocytes %, Automated   Date/Time Value Ref Range Status   07/17/2024 09:11 AM 0.5 0.0 - 0.9 % Final     Comment:     Immature Granulocyte Count (IG) includes promyelocytes, myelocytes and metamyelocytes but does not include bands. Percent differential counts (%) should be interpreted in the context of the absolute cell counts (cells/UL).   05/28/2024 07:32 AM 0.1 0.0 - 0.9 % Final     Comment:     Immature Granulocyte Count (IG) includes promyelocytes, myelocytes and metamyelocytes but does not include bands. Percent differential counts (%) should be interpreted in the context of the absolute cell counts (cells/UL).   08/13/2023 02:20 PM 0.3 0.0 - 0.9 % Final     Comment:      Immature Granulocyte Count (IG) includes promyelocytes,    myelocytes and metamyelocytes but does " not include bands.   Percent differential counts (%) should be interpreted in the   context of the absolute cell counts (cells/L).     04/02/2021 02:25 PM 0.2 0.0 - 0.9 % Final     Comment:      Immature Granulocyte Count (IG) includes promyelocytes,    myelocytes and metamyelocytes but does not include bands.   Percent differential counts (%) should be interpreted in the   context of the absolute cell counts (cells/L).     02/06/2021 08:29 AM 0.2 0.0 - 0.9 % Final     Comment:      Immature Granulocyte Count (IG) includes promyelocytes,    myelocytes and metamyelocytes but does not include bands.   Percent differential counts (%) should be interpreted in the   context of the absolute cell counts (cells/L).       Lymphocytes %   Date/Time Value Ref Range Status   07/17/2024 09:11 AM 18.1 13.0 - 44.0 % Final   05/28/2024 07:32 AM 23.0 13.0 - 44.0 % Final   08/13/2023 02:20 PM 19.6 13.0 - 44.0 % Final   04/02/2021 02:25 PM 26.4 13.0 - 44.0 % Final   02/06/2021 08:29 AM 31.3 13.0 - 44.0 % Final     Monocytes %   Date/Time Value Ref Range Status   07/17/2024 09:11 AM 11.7 2.0 - 10.0 % Final   05/28/2024 07:32 AM 7.3 2.0 - 10.0 % Final   08/13/2023 02:20 PM 5.8 2.0 - 10.0 % Final   04/02/2021 02:25 PM 6.6 2.0 - 10.0 % Final   02/06/2021 08:29 AM 8.1 2.0 - 10.0 % Final     Eosinophils %   Date/Time Value Ref Range Status   07/17/2024 09:11 AM 0.3 0.0 - 6.0 % Final   05/28/2024 07:32 AM 0.9 0.0 - 6.0 % Final   08/13/2023 02:20 PM 0.2 0.0 - 6.0 % Final   04/02/2021 02:25 PM 0.5 0.0 - 6.0 % Final   02/06/2021 08:29 AM 1.8 0.0 - 6.0 % Final     Basophils %   Date/Time Value Ref Range Status   07/17/2024 09:11 AM 0.3 0.0 - 2.0 % Final   05/28/2024 07:32 AM 0.4 0.0 - 2.0 % Final   08/13/2023 02:20 PM 0.3 0.0 - 2.0 % Final   04/02/2021 02:25 PM 0.3 0.0 - 2.0 % Final   02/06/2021 08:29 AM 0.6 0.0 - 2.0 % Final     Neutrophils Absolute   Date/Time Value Ref Range Status   07/17/2024 09:11 AM 3.97 1.20 - 7.70 x10*3/uL Final      Comment:     Percent differential counts (%) should be interpreted in the context of the absolute cell counts (cells/uL).   05/28/2024 07:32 AM 5.25 1.20 - 7.70 x10*3/uL Final     Comment:     Percent differential counts (%) should be interpreted in the context of the absolute cell counts (cells/uL).   08/13/2023 02:20 PM 4.85 1.20 - 7.70 x10E9/L Final   04/02/2021 02:25 PM 3.98 1.20 - 7.70 x10E9/L Final   02/06/2021 08:29 AM 2.87 1.20 - 7.70 x10E9/L Final     Immature Granulocytes Absolute, Automated   Date/Time Value Ref Range Status   07/17/2024 09:11 AM 0.03 0.00 - 0.70 x10*3/uL Final   05/28/2024 07:32 AM 0.01 0.00 - 0.70 x10*3/uL Final     Lymphocytes Absolute   Date/Time Value Ref Range Status   07/17/2024 09:11 AM 1.04 (L) 1.20 - 4.80 x10*3/uL Final   05/28/2024 07:32 AM 1.77 1.20 - 4.80 x10*3/uL Final   08/13/2023 02:20 PM 1.29 1.20 - 4.80 x10E9/L Final   04/02/2021 02:25 PM 1.59 1.20 - 4.80 x10E9/L Final   02/06/2021 08:29 AM 1.55 1.20 - 4.80 x10E9/L Final     Monocytes Absolute   Date/Time Value Ref Range Status   07/17/2024 09:11 AM 0.67 0.10 - 1.00 x10*3/uL Final   05/28/2024 07:32 AM 0.56 0.10 - 1.00 x10*3/uL Final   08/13/2023 02:20 PM 0.38 0.10 - 1.00 x10E9/L Final   04/02/2021 02:25 PM 0.40 0.10 - 1.00 x10E9/L Final   02/06/2021 08:29 AM 0.40 0.10 - 1.00 x10E9/L Final     Eosinophils Absolute   Date/Time Value Ref Range Status   07/17/2024 09:11 AM 0.02 0.00 - 0.70 x10*3/uL Final   05/28/2024 07:32 AM 0.07 0.00 - 0.70 x10*3/uL Final   08/13/2023 02:20 PM 0.01 0.00 - 0.70 x10E9/L Final   04/02/2021 02:25 PM 0.03 0.00 - 0.70 x10E9/L Final   02/06/2021 08:29 AM 0.09 0.00 - 0.70 x10E9/L Final     Basophils Absolute   Date/Time Value Ref Range Status   07/17/2024 09:11 AM 0.02 0.00 - 0.10 x10*3/uL Final   05/28/2024 07:32 AM 0.03 0.00 - 0.10 x10*3/uL Final   08/13/2023 02:20 PM 0.02 0.00 - 0.10 x10E9/L Final   04/02/2021 02:25 PM 0.02 0.00 - 0.10 x10E9/L Final   02/06/2021 08:29 AM 0.03 0.00 - 0.10  "x10E9/L Final       No components found for: \"PT\"  No results found for: \"APTT\"    Assessment/Plan         Diagnoses and all orders for this visit:  Elevated serum immunoglobulin free light chains  -     Referral to Hematology and Oncology  -     XR skeletal survey complete; Future  -     TSH with reflex to Free T4 if abnormal; Future  -     Vitamin B12; Future  -     Folate; Future  -     Clinic Appointment Request (review labs and bone survey from [facility]); Future  -     Lavender Top; Future         Elmer Powers MD  The patient is a 54-year-old man with past history of pulmonary emboli, factor V Leiden mutation, BRCA2 mutation positive was referred for further evaluation and management of slightly elevated free kappa light chains at 2.14 mg/dL reference range 0.33-1.94 mg/dL.  Physical examination within normal limits.  I had a detailed discussion with the patient explained to him the significance of mildly elevated free kappa light chains.  Differential diagnosis includes MGUS, light chain multiple myeloma, nonsecretory multiple myeloma.  Serum protein electrophoresis did not reveal M protein.  I have recommended a bone survey.  If there are no osteolytic lesions it is less likely the patient has multiple myeloma.  Incidentally 10 to 15% of patients with M protein/free light chains have peripheral neuropathy.  The patient has an appoint for EMG, nerve conduction studies.  The patient understood appreciated all the details provided and was grateful.  The patient had come for follow-up on September 25, 2024 regarding history of elevated free kappa light chains.  The patient has a past history of chronic low back pain.  Bone survey on September 5, 2024 did not reveal any evidence of osteolytic lesion.  There was spondylolisthesis on L2-3.  Had a detailed discussion with the patient and explained to him that this is suggestive of MGUS and would recommend clinical follow-up.  Could also consider bone marrow aspiration " and biopsy the patient would like to think about it.  Follow clinically return in 6 months.  The patient is pleased.    Thank you for allowing me to participate in care of your patient if you have any questions please feel free to call me.

## 2024-10-17 ENCOUNTER — APPOINTMENT (OUTPATIENT)
Dept: CARDIOLOGY | Facility: CLINIC | Age: 54
End: 2024-10-17
Payer: COMMERCIAL

## 2024-10-17 VITALS — HEART RATE: 59 BPM | DIASTOLIC BLOOD PRESSURE: 86 MMHG | OXYGEN SATURATION: 97 % | SYSTOLIC BLOOD PRESSURE: 132 MMHG

## 2024-10-17 DIAGNOSIS — I26.99 PULMONARY EMBOLISM, UNSPECIFIED CHRONICITY, UNSPECIFIED PULMONARY EMBOLISM TYPE, UNSPECIFIED WHETHER ACUTE COR PULMONALE PRESENT (MULTI): Primary | ICD-10-CM

## 2024-10-17 DIAGNOSIS — D68.51 FACTOR V LEIDEN (MULTI): ICD-10-CM

## 2024-10-17 PROCEDURE — 99213 OFFICE O/P EST LOW 20 MIN: CPT | Performed by: INTERNAL MEDICINE

## 2024-10-17 NOTE — PROGRESS NOTES
History of Present Illness:  Lawrence Grier is a/an 54 y.o. man who follows up for unprovoked PE. He is on anticoagulation with apixaban. After I first saw him last we got a venous duplex ultrasound (about a month after PE dx), which showed chronic changes in the left posterior tibial vein.     He denies bleeding including epistaxis, gingival bleeding, hemoptysis, hematemesis, hematochezia, melena, and hematuria.     Since last visit he was found to have elevated light changes; saw hematology. Had bone survey. Findings consistent with MGUS.    Feeling well.       Past Medical History:   Diagnosis Date    Androgenic alopecia     Factor V Leiden (Multi)     Pulmonary embolus (Multi)     2 separate unprovoked PE     Past Surgical History:   Procedure Laterality Date    LUMBAR EPIDURAL INJECTION       Social History     Tobacco Use    Smoking status: Never    Smokeless tobacco: Never   Vaping Use    Vaping status: Never Used   Substance Use Topics    Alcohol use: Yes     Alcohol/week: 7.0 standard drinks of alcohol     Types: 7 Cans of beer per week     Comment: 1 beer per day    Drug use: Never     Family History   Problem Relation Name Age of Onset    Breast cancer Mother Darleen Grier     No Known Problems Father      No Known Problems Sister      No Known Problems Sister      No Known Problems Daughter          Jr in College    No Known Problems Son      No Known Problems Son          Medical school - [employer]     Current Outpatient Medications   Medication Sig Dispense Refill    albuterol 90 mcg/actuation inhaler Inhale 2 puffs every 6 hours if needed for wheezing. 18 g 1    apixaban (Eliquis) 5 mg tablet Take 1 tablet (5 mg) by mouth 2 times a day. 180 tablet 3    celecoxib (CeleBREX) 200 mg capsule Take 1 capsule (200 mg) by mouth 2 times a day.      diazePAM (Valium) 10 mg tablet Take 30 min prior to flight. 12 tablet 0    finasteride (Propecia) 1 mg tablet TAKE ONE TABLET BY MOUTH ONCE DAILY 90 tablet 3    tadalafil  (Cialis) 2.5 mg tablet Take 1 tablet (2.5 mg) by mouth once daily.       No current facility-administered medications for this visit.       Physical Examination:  Blood pressure 132/86, pulse 59, SpO2 97%.  No distress  No JVD or carotid bruits  Lungs clear bilaterally  Heart regular and without murmurs  Abdomen soft and non-tender  No leg swelling  Pulses intact    Pertinent Labs:    Pertinent Imaging:  CTA 12/20/2023  IMPRESSION:  1.  This exam is positive for the presence of acute pulmonary emboli.  There is no convincing evidence for right ventricular strain.    Venous duplex ultrasound 1/18/2024   CONCLUSIONS:     Right Lower Venous: No evidence of acute deep vein thrombus visualized in the right lower extremity.     Left Lower Venous: No evidence of acute deep vein thrombus visualized in the left lower extremity. There are chronic changes visualized in the posterior tibial vein.    Diagnoses and all orders for this visit:  Pulmonary embolism, unspecified chronicity, unspecified pulmonary embolism type, unspecified whether acute cor pulmonale present (Multi) (Primary)  Factor V Leiden (Multi)  Continue indefinite anticoagulation       Consuelo Bearden MD, MS

## 2024-11-01 ENCOUNTER — APPOINTMENT (OUTPATIENT)
Dept: NEUROLOGY | Facility: CLINIC | Age: 54
End: 2024-11-01
Payer: COMMERCIAL

## 2024-11-01 VITALS
DIASTOLIC BLOOD PRESSURE: 83 MMHG | WEIGHT: 166 LBS | HEART RATE: 74 BPM | HEIGHT: 69 IN | BODY MASS INDEX: 24.59 KG/M2 | SYSTOLIC BLOOD PRESSURE: 127 MMHG | TEMPERATURE: 96.9 F

## 2024-11-01 DIAGNOSIS — G60.9 IDIOPATHIC PERIPHERAL NEUROPATHY: Primary | ICD-10-CM

## 2024-11-01 PROCEDURE — 95886 MUSC TEST DONE W/N TEST COMP: CPT | Performed by: PSYCHIATRY & NEUROLOGY

## 2024-11-01 PROCEDURE — 95910 NRV CNDJ TEST 7-8 STUDIES: CPT | Performed by: PSYCHIATRY & NEUROLOGY

## 2024-11-06 ENCOUNTER — APPOINTMENT (OUTPATIENT)
Dept: PRIMARY CARE | Facility: CLINIC | Age: 54
End: 2024-11-06
Payer: COMMERCIAL

## 2024-11-06 VITALS
OXYGEN SATURATION: 99 % | HEART RATE: 64 BPM | DIASTOLIC BLOOD PRESSURE: 83 MMHG | SYSTOLIC BLOOD PRESSURE: 128 MMHG | BODY MASS INDEX: 24.48 KG/M2 | WEIGHT: 165.8 LBS | TEMPERATURE: 98 F

## 2024-11-06 DIAGNOSIS — I26.99 PULMONARY EMBOLISM, OTHER, UNSPECIFIED CHRONICITY, UNSPECIFIED WHETHER ACUTE COR PULMONALE PRESENT (MULTI): ICD-10-CM

## 2024-11-06 DIAGNOSIS — F40.243 ANXIETY WITH FLYING: ICD-10-CM

## 2024-11-06 DIAGNOSIS — R76.8 ELEVATED SERUM IMMUNOGLOBULIN FREE LIGHT CHAINS: ICD-10-CM

## 2024-11-06 DIAGNOSIS — G62.9 NEUROPATHY: Primary | ICD-10-CM

## 2024-11-06 DIAGNOSIS — F41.9 ANXIETY: ICD-10-CM

## 2024-11-06 PROCEDURE — 99214 OFFICE O/P EST MOD 30 MIN: CPT | Performed by: INTERNAL MEDICINE

## 2024-11-06 RX ORDER — DIAZEPAM 10 MG/1
TABLET ORAL
Qty: 24 TABLET | Refills: 0 | Status: SHIPPED | OUTPATIENT
Start: 2024-11-06

## 2024-11-06 NOTE — PATIENT INSTRUCTIONS
Blood work now     Unwinding Anxiety -  Dr. Katya Jones - Relaxation Response  [facility] [place] - Wherever You Go There You Are     Ok to use diazepam as needed

## 2024-11-06 NOTE — PROGRESS NOTES
Chief Complaint:   Follow-up (Lab results)     History Of Present Illness:    Lawrence Grier is a 54 y.o. male with active medical problems outlined below who presents for evaluation and management of neuropathy and anxiety.      4/17/24 Initial Visit   Lawrence started finasteride 3 years ago.  He initiated the medication for hair loss, and seems to be working.  He is now experiencing symptoms that may be related to the medication.  He endorses fatigue and feels very washed out by the end of the day.  He is using muscle mass and gaining weight on his midsection.  His libido is down, and is more difficult for him to maintain a rigid erection.  He has not had a testosterone level assessed recently.    He has a history of multiple pulmonary emboli.  The first was in 2013, and he was diagnosed with multiple bilateral pulmonary emboli on 12/20/2023.  He is now on Eliquis 5 mg twice daily.  He was evaluated by cardiologist and a vascular specialist.  It is not clear if he met with a hematologist or had a complete hypercoagulability workup.  He does not have any family history of blood clots.  He is a runner and does a 5 mile run 3 to 4 days/week.  He denies chest pain or shortness of breath with physical exertion.  He completed a stress test on 8/5/2022 that showed preserved LV function and no ischemia.  Appetite is normal he denies any change in bowel habits.  He denies any new urinary symptoms.    1/18/24  Echocardiogram - EF 65%    Stress test 8/25/23  Summary:  1. The resting ejection fraction was estimated at 60 to 65% with a peak exercise ejection fraction estimated at 65 to 70%.  2. Normal global left ventricular systolic function.  3. Adequate level of stress achieved.  4. No electrocardiographic, clinical or echocardiographic evidence for ischemia at a maximal workload.  5. No ischemia by ECG at max workload.  6. Normal Stress Test.    CT 12/20/23  Multiple PE     INTERVAL HISTORY 7/17/24 Saturday night not  "feeling well - woke up Sunday am with congestion, achy, neck and back very sore   Feels \"like I've aged 30 years\" in the past few months  Joint pain, legs feel hot - not numb, no tingling.  Not hot to touch when he or his wife palpates his legs.   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  1/18/24 - vascular US - no dvt, chronic changes in left leg   Taking diazepam prior to travel      INTERVAL HISTORY 11/9/24  Neuropathy progressive over the past 1-2 years - uncomfortable sensation in legs.    Described as \"pretty uncomfortable\", can interfere with sleep   Some tingling in hands - longer interval, present before symptoms in legs.  Symptoms in hands have not progressed.   EMG with axonal polyneuropathy, L4 radiculopathy on left and poss L peroneal injury  Having increased anxiety - does not sleep when traveling.  Has to use diazepam prior to travel in order to get sleep.          Patient Active Problem List   Diagnosis    Pulmonary embolus    Androgenic alopecia    Factor V Leiden (Multi)    Routine health maintenance    Other fatigue    Respiratory infection    Anemia    Nocturia    Myalgia    Neck pain    Chronic bilateral low back pain without sciatica    Neuropathy    Elevated serum immunoglobulin free light chains    Anxiety       Current Outpatient Medications:     albuterol 90 mcg/actuation inhaler, Inhale 2 puffs every 6 hours if needed for wheezing., Disp: 18 g, Rfl: 1    apixaban (Eliquis) 5 mg tablet, Take 1 tablet (5 mg) by mouth 2 times a day., Disp: 180 tablet, Rfl: 3    finasteride (Propecia) 1 mg tablet, TAKE ONE TABLET BY MOUTH ONCE DAILY, Disp: 90 tablet, Rfl: 3    tadalafil (Cialis) 2.5 mg tablet, Take 1 tablet (2.5 mg) by mouth once daily., Disp: , Rfl:     diazePAM (Valium) 10 mg tablet, Take 30 min prior " to flight., Disp: 24 tablet, Rfl: 0  Patient has no known allergies.  Social History     Tobacco Use    Smoking status: Never    Smokeless tobacco: Never   Vaping Use    Vaping status: Never Used   Substance Use Topics    Alcohol use: Yes     Alcohol/week: 7.0 standard drinks of alcohol     Types: 7 Cans of beer per week     Comment: 1 beer per day    Drug use: Never         All pertinent aspects of medical and surgical history were reviewed and updated in chart    Review of Systems   Pertinent positives in review of systems outlined above.  Complete ROS otherwise negative.        Last Recorded Vitals:  No data found.          The 10-year ASCVD risk score (Marcia MALCOLM, et al., 2019) is: 4.9%    Values used to calculate the score:      Age: 54 years      Sex: Male      Is Non- : No      Diabetic: No      Tobacco smoker: No      Systolic Blood Pressure: 128 mmHg      Is BP treated: No      HDL Cholesterol: 59.1 mg/dL      Total Cholesterol: 218 mg/dL      Assessment/Plan   Problem List Items Addressed This Visit       Pulmonary embolus     Has hx of 2 PE and will remain on lifelong anticoagulation.           Neuropathy - Primary     Normal blood sugar and B12/folate this year.  Will repeat screening for paraprotein and include heavy metals screening.  Referred to neurology for recommendations on additional testing,          Relevant Orders    CBC and Auto Differential    Serum Protein Electrophoresis    Urine Protein Electrophoresis    [place]/Lambda Free Light Chain, Serum    Comprehensive Metabolic Panel    Heavy Metals Screen, Urine    Anti-SSA    Anti-SSB    Referral to Neurology    Elevated serum immunoglobulin free light chains     Will repeat CBC, SPEP, UPEP, serum free light chains.  Per hematology, elevated light chains are associated with neuropathy.  Will refer back to hematology if levels have progressed from last assessment          Relevant Orders    CBC and Auto Differential    Serum  Protein Electrophoresis    Urine Protein Electrophoresis    Cade/Lambda Free Light Chain, Serum    Anxiety     We discussed breathing exercises and midnfulness techniques to manage anxiety related to travel.  I directed Lawrence to several online resources to help with self care.  To consider working with a psychologist on CBT.           Relevant Orders    Referral to Psychology     Other Visit Diagnoses       Anxiety with flying        Relevant Medications    diazePAM (Valium) 10 mg tablet            A total of 30 minutes was spent reviewing the chart and recent testing and discussing plan of care.         Zohaib Coffey MD

## 2024-11-10 PROBLEM — G62.9 NEUROPATHY: Status: ACTIVE | Noted: 2024-11-10

## 2024-11-10 PROBLEM — R76.8 ELEVATED SERUM IMMUNOGLOBULIN FREE LIGHT CHAINS: Status: ACTIVE | Noted: 2024-11-10

## 2024-11-10 PROBLEM — F41.9 ANXIETY: Status: ACTIVE | Noted: 2024-11-10

## 2024-11-10 NOTE — ASSESSMENT & PLAN NOTE
Normal blood sugar and B12/folate this year.  Will repeat screening for paraprotein and include heavy metals screening.  Referred to neurology for recommendations on additional testing,

## 2024-11-10 NOTE — ASSESSMENT & PLAN NOTE
We discussed breathing exercises and midnfulness techniques to manage anxiety related to travel.  I directed Lawrence to several online resources to help with self care.  To consider working with a psychologist on CBT.

## 2024-11-10 NOTE — ASSESSMENT & PLAN NOTE
Will repeat CBC, SPEP, UPEP, serum free light chains.  Per hematology, elevated light chains are associated with neuropathy.  Will refer back to hematology if levels have progressed from last assessment

## 2024-11-15 ENCOUNTER — LAB (OUTPATIENT)
Dept: LAB | Facility: LAB | Age: 54
End: 2024-11-15
Payer: COMMERCIAL

## 2024-11-15 DIAGNOSIS — G62.9 NEUROPATHY: ICD-10-CM

## 2024-11-15 DIAGNOSIS — R76.8 ELEVATED SERUM IMMUNOGLOBULIN FREE LIGHT CHAINS: ICD-10-CM

## 2024-11-15 LAB
ALBUMIN SERPL BCP-MCNC: 4.6 G/DL (ref 3.4–5)
ALP SERPL-CCNC: 60 U/L (ref 33–120)
ALT SERPL W P-5'-P-CCNC: 20 U/L (ref 10–52)
ANION GAP SERPL CALC-SCNC: 13 MMOL/L (ref 10–20)
AST SERPL W P-5'-P-CCNC: 21 U/L (ref 9–39)
BASOPHILS # BLD AUTO: 0.02 X10*3/UL (ref 0–0.1)
BASOPHILS NFR BLD AUTO: 0.3 %
BILIRUB SERPL-MCNC: 0.6 MG/DL (ref 0–1.2)
BUN SERPL-MCNC: 13 MG/DL (ref 6–23)
CALCIUM SERPL-MCNC: 9.8 MG/DL (ref 8.6–10.6)
CHLORIDE SERPL-SCNC: 104 MMOL/L (ref 98–107)
CO2 SERPL-SCNC: 29 MMOL/L (ref 21–32)
CREAT SERPL-MCNC: 0.88 MG/DL (ref 0.5–1.3)
EGFRCR SERPLBLD CKD-EPI 2021: >90 ML/MIN/1.73M*2
ENA SS-A AB SER IA-ACNC: <0.2 AI
ENA SS-B AB SER IA-ACNC: <0.2 AI
EOSINOPHIL # BLD AUTO: 0.06 X10*3/UL (ref 0–0.7)
EOSINOPHIL NFR BLD AUTO: 1 %
ERYTHROCYTE [DISTWIDTH] IN BLOOD BY AUTOMATED COUNT: 12.5 % (ref 11.5–14.5)
GLUCOSE SERPL-MCNC: 83 MG/DL (ref 74–99)
HCT VFR BLD AUTO: 43.9 % (ref 41–52)
HGB BLD-MCNC: 14.3 G/DL (ref 13.5–17.5)
IMM GRANULOCYTES # BLD AUTO: 0.02 X10*3/UL (ref 0–0.7)
IMM GRANULOCYTES NFR BLD AUTO: 0.3 % (ref 0–0.9)
LYMPHOCYTES # BLD AUTO: 1.61 X10*3/UL (ref 1.2–4.8)
LYMPHOCYTES NFR BLD AUTO: 27 %
MCH RBC QN AUTO: 29.9 PG (ref 26–34)
MCHC RBC AUTO-ENTMCNC: 32.6 G/DL (ref 32–36)
MCV RBC AUTO: 92 FL (ref 80–100)
MONOCYTES # BLD AUTO: 0.43 X10*3/UL (ref 0.1–1)
MONOCYTES NFR BLD AUTO: 7.2 %
NEUTROPHILS # BLD AUTO: 3.82 X10*3/UL (ref 1.2–7.7)
NEUTROPHILS NFR BLD AUTO: 64.2 %
NRBC BLD-RTO: 0 /100 WBCS (ref 0–0)
PLATELET # BLD AUTO: 216 X10*3/UL (ref 150–450)
POTASSIUM SERPL-SCNC: 4.2 MMOL/L (ref 3.5–5.3)
PROT SERPL-MCNC: 7.4 G/DL (ref 6.4–8.2)
PROT SERPL-MCNC: 7.4 G/DL (ref 6.4–8.2)
RBC # BLD AUTO: 4.79 X10*6/UL (ref 4.5–5.9)
SODIUM SERPL-SCNC: 142 MMOL/L (ref 136–145)
WBC # BLD AUTO: 6 X10*3/UL (ref 4.4–11.3)

## 2024-11-15 PROCEDURE — 80053 COMPREHEN METABOLIC PANEL: CPT

## 2024-11-15 PROCEDURE — 85025 COMPLETE CBC W/AUTO DIFF WBC: CPT

## 2024-11-15 PROCEDURE — 84165 PROTEIN E-PHORESIS SERUM: CPT

## 2024-11-15 PROCEDURE — 86235 NUCLEAR ANTIGEN ANTIBODY: CPT

## 2024-11-15 PROCEDURE — 36415 COLL VENOUS BLD VENIPUNCTURE: CPT

## 2024-11-15 PROCEDURE — 83521 IG LIGHT CHAINS FREE EACH: CPT

## 2024-11-15 PROCEDURE — 84155 ASSAY OF PROTEIN SERUM: CPT

## 2024-11-17 LAB
KAPPA LC SERPL-MCNC: 1.95 MG/DL (ref 0.33–1.94)
KAPPA LC/LAMBDA SER: 1.25 {RATIO} (ref 0.26–1.65)
LAMBDA LC SERPL-MCNC: 1.56 MG/DL (ref 0.57–2.63)

## 2024-11-18 ENCOUNTER — LAB (OUTPATIENT)
Dept: LAB | Facility: LAB | Age: 54
End: 2024-11-18
Payer: COMMERCIAL

## 2024-11-18 DIAGNOSIS — R76.8 ELEVATED SERUM IMMUNOGLOBULIN FREE LIGHT CHAINS: ICD-10-CM

## 2024-11-18 DIAGNOSIS — G62.9 NEUROPATHY: ICD-10-CM

## 2024-11-18 LAB
ALBUMIN: 4.5 G/DL (ref 3.4–5)
ALPHA 1 GLOBULIN: 0.3 G/DL (ref 0.2–0.6)
ALPHA 2 GLOBULIN: 0.7 G/DL (ref 0.4–1.1)
BETA GLOBULIN: 0.9 G/DL (ref 0.5–1.2)
GAMMA GLOBULIN: 1.1 G/DL (ref 0.5–1.4)
PATH REVIEW-SERUM PROTEIN ELECTROPHORESIS: NORMAL
PROT UR-ACNC: 4 MG/DL (ref 5–25)
PROTEIN ELECTROPHORESIS COMMENT: NORMAL

## 2024-11-18 PROCEDURE — 84166 PROTEIN E-PHORESIS/URINE/CSF: CPT | Performed by: INTERNAL MEDICINE

## 2024-11-18 PROCEDURE — 84166 PROTEIN E-PHORESIS/URINE/CSF: CPT

## 2024-11-18 PROCEDURE — 84156 ASSAY OF PROTEIN URINE: CPT

## 2024-11-18 PROCEDURE — 82175 ASSAY OF ARSENIC: CPT

## 2024-11-19 ENCOUNTER — TELEPHONE (OUTPATIENT)
Dept: PRIMARY CARE | Facility: CLINIC | Age: 54
End: 2024-11-19
Payer: COMMERCIAL

## 2024-11-19 LAB
ALBUMIN MFR UR ELPH: 38.2 %
ALPHA1 GLOB MFR UR ELPH: 12.5 %
ALPHA2 GLOB MFR UR ELPH: 18.4 %
B-GLOBULIN MFR UR ELPH: 18.8 %
GAMMA GLOB MFR UR ELPH: 12.1 %
PATH REVIEW-URINE PROTEIN ELECTROPHORESIS: NORMAL
URINE ELECTROPHORESIS COMMENT: NORMAL

## 2024-11-19 NOTE — TELEPHONE ENCOUNTER
Mr. Grier had his wife called. He would like to speak with you concerning new test results that are in  is my brock please advise patient.   Thanks    Irma

## 2024-11-19 NOTE — TELEPHONE ENCOUNTER
I contacted Lawrence via the portal.  So far results look good but several are still pending.  I will be in touch  when I have those results.

## 2024-11-22 LAB
ARSENIC 24H UR-MRATE: 48.2 UG/D (ref 0–49.9)
ARSENIC UR-MCNC: 14.2 UG/L (ref 0–34.9)
ARSENIC/CREAT UR: 17.1 UG/G CRT (ref 0–29.9)
COLLECT DURATION TIME SPEC: 24 HR
CREAT 24H UR-MRATE: 2818 MG/D (ref 800–2100)
CREAT UR-MCNC: 83 MG/DL
LEAD 24H UR-MRATE: ABNORMAL UG/D (ref 0–8.1)
LEAD UR-MCNC: <5 UG/L (ref 0–5)
LEAD/CREAT UR: ABNORMAL UG/G CRT (ref 0–5)
MERCURY 24H UR-MRATE: ABNORMAL UG/D (ref 0–20)
MERCURY UR-MCNC: <2.5 UG/L (ref 0–5)
MERCURY/CREAT UR: ABNORMAL UG/G CRT (ref 0–20)
SPECIMEN VOL ?TM UR: 3395 ML

## 2024-11-24 ENCOUNTER — PATIENT MESSAGE (OUTPATIENT)
Dept: PRIMARY CARE | Facility: CLINIC | Age: 54
End: 2024-11-24
Payer: COMMERCIAL

## 2024-12-13 ENCOUNTER — PATIENT MESSAGE (OUTPATIENT)
Dept: PRIMARY CARE | Facility: CLINIC | Age: 54
End: 2024-12-13
Payer: COMMERCIAL

## 2024-12-13 DIAGNOSIS — G62.9 NEUROPATHY: Primary | ICD-10-CM

## 2024-12-18 RX ORDER — GABAPENTIN 100 MG/1
CAPSULE ORAL
Qty: 81 CAPSULE | Refills: 1 | Status: SHIPPED | OUTPATIENT
Start: 2024-12-18 | End: 2025-01-17

## 2024-12-23 DIAGNOSIS — I26.99 PULMONARY EMBOLISM, UNSPECIFIED CHRONICITY, UNSPECIFIED PULMONARY EMBOLISM TYPE, UNSPECIFIED WHETHER ACUTE COR PULMONALE PRESENT (MULTI): ICD-10-CM

## 2025-01-21 ENCOUNTER — OFFICE VISIT (OUTPATIENT)
Dept: NEUROLOGY | Facility: HOSPITAL | Age: 55
End: 2025-01-21
Payer: COMMERCIAL

## 2025-01-21 VITALS
HEART RATE: 73 BPM | BODY MASS INDEX: 23.62 KG/M2 | SYSTOLIC BLOOD PRESSURE: 110 MMHG | DIASTOLIC BLOOD PRESSURE: 67 MMHG | RESPIRATION RATE: 17 BRPM | WEIGHT: 165 LBS | HEIGHT: 70 IN

## 2025-01-21 DIAGNOSIS — G62.9 PERIPHERAL POLYNEUROPATHY: Primary | ICD-10-CM

## 2025-01-21 PROCEDURE — 99214 OFFICE O/P EST MOD 30 MIN: CPT | Mod: GC | Performed by: STUDENT IN AN ORGANIZED HEALTH CARE EDUCATION/TRAINING PROGRAM

## 2025-01-21 PROCEDURE — 99204 OFFICE O/P NEW MOD 45 MIN: CPT | Performed by: STUDENT IN AN ORGANIZED HEALTH CARE EDUCATION/TRAINING PROGRAM

## 2025-01-21 PROCEDURE — 3008F BODY MASS INDEX DOCD: CPT | Performed by: STUDENT IN AN ORGANIZED HEALTH CARE EDUCATION/TRAINING PROGRAM

## 2025-01-21 ASSESSMENT — PAIN SCALES - GENERAL: PAINLEVEL_OUTOF10: 4

## 2025-01-21 NOTE — PATIENT INSTRUCTIONS
Dear  Cherrie,    You were seen in the neurology clinics for neuropathy. We will order more blood work to work up the cause of your neuropathy.   Please also consider starting on gabapentin to help with your symptoms.    The titration plan for gabapentin:  - 100mg 1 tablet at bedtime for 1 week then  - 100mg 2 tablets at bedtime for 1 week then  - 100mg 3 tablets at bedtime and stay on this dose    We will also refer you to neuromuscular specialist to further workup your neuropathy.    It was a pleasure taking care of you.   Neurology team

## 2025-01-21 NOTE — PROGRESS NOTES
"OUTPATIENT CLINIC NOTE    History of Present Illness: Lawrence Grier is a 54 y.o. male with a PMHx of pulmonary emboli, factor V Leiden mutation, BRCA2 mutation positive, androgenic alopecia, anemia, elevated serum immunoglobulin free light chains, back pain, anxiety who presented as a referral from PCP for neuropathy.    The patient endorsed having \"heat\" in both of this legs in the thighs and upper legs (but not in the feet) for 2 years. Saying that the symptoms come at night and he has to sleep wearing pants or put pillows between his legs to prevent them from touching. The symptoms have been stable for 2 years.    He also complains of pin and needle sensation for a couple of months in both feet in the plantar surface.     Denies any weakness. Saying that he felt weaker in his legs but attribute to age. He is able to do leg exercise and run 4-5 miles a couple of times per week. Denies episodes of leg giving out. Denies numbness or weakness in both hands.    He has seen PCP who sent for workup. Found to have elevated serum free light chains. He has seen oncologist who did Xray skeletal survey and found no lesion. No bone biopsy. He was also prescribed gabapentin but has not used it.     Of note, the patient has lower back pain, described as dull but it restrict his movement. Had referral to PT but did not follow. Had seen pain management and get injections, which help with the pain. Denies any shooting pain down his legs. He also has pain in the occiput.    Per chart review  Last seen oncology 9/25/2024. Noted to have no evidence of osteolytic lesion on bone survey. Had a detailed discussion with the patient and explained to him that this is suggestive of MGUS and would recommend clinical follow-up.  Could also consider bone marrow aspiration and biopsy the patient would like to think about it.  Follow clinically return in 6 months.      ROS: All systems reviewed and were negative except as above    Home " Medications:    Current Outpatient Medications   Medication Instructions    albuterol 90 mcg/actuation inhaler 2 puffs, inhalation, Every 6 hours PRN    apixaban (ELIQUIS) 5 mg, oral, 2 times daily    diazePAM (Valium) 10 mg tablet Take 30 min prior to flight.    finasteride (PROPECIA) 1 mg, oral, Daily    gabapentin (Neurontin) 100 mg capsule Take 1 capsule (100 mg) by mouth once daily at bedtime for 3 days, THEN 2 capsules (200 mg) once daily at bedtime for 3 days, THEN 3 capsules (300 mg) once daily at bedtime for 24 days.    tadalafil (CIALIS) 2.5 mg, Daily     Past Medical History:    has a past medical history of Androgenic alopecia, Factor V Leiden (Multi), and Pulmonary embolus.    Past Surgical History:    has a past surgical history that includes Lumbar epidural injection.    Allergies:   No Known Allergies    Family History:   Family History   Problem Relation Name Age of Onset    Breast cancer Mother Darleen Grier     No Known Problems Father      No Known Problems Sister      No Known Problems Sister      No Known Problems Daughter          Jr in College    No Known Problems Son      No Known Problems Son          Medical school - [employer]     Denies family history of neurological disease.    Past Social History:   Social History     Tobacco Use    Smoking status: Never    Smokeless tobacco: Never   Vaping Use    Vaping status: Never Used   Substance Use Topics    Alcohol use: Yes     Alcohol/week: 7.0 standard drinks of alcohol     Types: 7 Cans of beer per week     Comment: 1 beer per day    Drug use: Never     Alcohol: 1 beer can every day  No smoking, no illicit drug use    Vitals:   Heart Rate:  [73] 73  Resp:  [17] 17  BP: (110)/(67) 110/67    Physical Exam:   General Appearance:  No distress, alert, interactive and cooperative.      Mental State: Orientation was normal to time, place and person. Recent and remote memory was intact.  Attention span and concentration were normal.     Cranial Nerves:   CN  2: Visual fields full to confrontation.   CN 3, 4, 6: Pupils round, 4 mm in diameter, equally reactive to light. Lids symmetric; no ptosis. EOMs normal alignment, full range with normal saccades, pursuit and convergence.   No nystagmus.   CN 5: Facial sensation intact bilaterally.   CN 7: Normal and symmetric facial strength. Nasolabial folds symmetric.   CN 8: Hearing intact to voice  CN 9: Palate elevates symmetrically.   CN 11: Normal strength of shoulder shrug and neck turning.   CN 12: Tongue midline, with normal bulk and strength; no fasciculations.     Motor: Muscle bulk and tone were normal in both upper and lower extremities. No fasciculations, tremor or other abnormal movements were present. Muscle strength was 5/5 in distal and proximal muscles in both upper and lower extremities.    Reflexes:  R L  Biceps  +2 +2  Brachioradialis +2 +2  Triceps  +2 +2  Patellar  +1 +1  Achilles +0*  +0* * Even with reinforcement    Right Plantar: downgoing  Left Plantar: downgoing    Right pathological reflexes: Radha's absent. Ankle clonus absent.  Left pathological reflexes: Radha's absent. Ankle clonus absent.  No frontal release signs or other pathologic reflexes present.     Sensory: In both upper and lower extremities, sensation was intact to light touch.   Pinprick sensation: endorse more sensitivity in the feet. No glove-stocking pattern.  Cold sensation: loss of cold sensation in both feet up to mid leg.  Vibration: Mild loss of sensation on the right toe  Proprioception: Intact BLE    Coordination: In both upper extremities, finger-nose-finger was intact without dysmetria or overshoot. In both lower extremities, heel-to-shin was intact.     Gait: Station was stable with a normal base. Gait was stable with a normal arm swing and speed. No ataxia, shuffling, steppage or waddling was present. No circumduction was present. Tandem gait was intact. No Romberg sign was present.       Labs:                            BNP   Date/Time Value Ref Range Status   08/13/2023 02:20 PM 14 0 - 99 pg/mL Final     Comment:     .  <100 pg/mL - Heart failure unlikely  100-299 pg/mL - Intermediate probability of acute heart  .               failure exacerbation. Correlate with clinical  .               context and patient history.    >=300 pg/mL - Heart Failure likely. Correlate with clinical  .               context and patient history.  BNP testing is performed using different testing   methodology at [facility] than at other   [facility]. Direct result comparisons should   only be made within the same method.         Lab Results   Component Value Date    GLUCOSEU Normal 07/17/2024    BLOODU NEGATIVE 07/17/2024    PROTUR NEGATIVE 07/17/2024    NITRITEU NEGATIVE 07/17/2024    LEUKOCYTESU NEGATIVE 07/17/2024     Thyroid Stimulating Hormone   Date/Time Value Ref Range Status   09/04/2024 11:47 AM 2.39 0.44 - 3.98 mIU/L Final     Vitamin B12   Date/Time Value Ref Range Status   09/04/2024 11:47  211 - 911 pg/mL Final     Folate, Serum   Date/Time Value Ref Range Status   09/04/2024 11:47 AM >24.0 >5.0 ng/mL Final     Urine heavy metal (11/2024): neg  Anti SSA, SSB (11/2024): neg  JAVIER (7/2024): neg  RF, ESR (7/2024): neg  [place] light chain: 2.13 (H, 7/2024) --> 1.95 (H, 11/2024)  SPEP (11/2024): normal  No A1c in the system    Imaging:  No MRI head results found for the past 12 months  No CT head results found for the past 12 months    Procedure:  EMG (11/4/24)  The study is abnormal. There is electrophysiological evidence for a sensorimotor, axonal periperal neuropathy with additional mild peroneal mononeuropathy at the left fibular head. There is electrophysiological evidence suggestive of an additional left L4 radiculopathy. There is no definite electrophysiologial evidence for a lumbosacral plexopathy in either leg.    Assessment/Recommendations  Lawrence Grier is a 54 y.o.  male with a PMHx of pulmonary emboli,  factor V Leiden mutation, BRCA2 mutation positive, androgenic alopecia, anemia, elevated serum immunoglobulin free light chains, back pain, anxiety who presented as a referral from PCP for neuropathy. History notable for chronic small fiber neuropathy with physical examination shows length-dependent glove stocking pattern loss of cold sensation in BLE. EMG showed sensorimotor, axonal periperal neuropathy with mild left peroneal mononeuropathy and left L4 radiculopathy. The patient symptoms are likely to be from polyneuropathy. The cause is currently unknown. One potential cause is elevated keppa light chains/ MGUS, which could cause neuropathy. Will further workup with HbA1C, copper, vitamin E, vitamin B1, vitamin B6 and refer patient to neuromuscular for further recommendations, especially if the patient needs further workup with bone marrow biopsy. In the mean time, patient can start gabapentin for symptomatic treatment.    Impression:  Polyneuropathy. Potentially from paraproteinemia    Plan/ Recommendation:   - Lab: HbA1c, copper, vitamin E, vitamin B1, vitamin B6  - Start gabapentin titration as follows:  - 100mg at bedtime for 1 week then  - 200mg at bedtime for 1 week then  - 300mg at bedtime  - Referral to neuromuscular neurologist    The patient has been seen, examined, and discussed with Dr. Peña.    Manuel Mckeon MD PhD  Neurology resident, PGY-2

## 2025-01-22 ENCOUNTER — TELEPHONE (OUTPATIENT)
Dept: PRIMARY CARE | Facility: CLINIC | Age: 55
End: 2025-01-22
Payer: COMMERCIAL

## 2025-01-22 NOTE — TELEPHONE ENCOUNTER
Talked to marie. Wants to sign up for practice. Nannette, please call and set up physical and give him the overview of practice. I know him and we dont need a meet and greet(unless he wants)  He also needs a follow up set up with dr bernstein, hem onc. He saw dr bernstein in fall and was told to make a 6 month follow up. Told him you would call by tomorrow.

## 2025-01-28 ENCOUNTER — APPOINTMENT (OUTPATIENT)
Dept: NEUROLOGY | Facility: HOSPITAL | Age: 55
End: 2025-01-28
Payer: COMMERCIAL

## 2025-01-31 DIAGNOSIS — Z00.00 HEALTHCARE MAINTENANCE: ICD-10-CM

## 2025-02-06 PROBLEM — D47.2 MONOCLONAL GAMMOPATHY OF UNKNOWN SIGNIFICANCE (MGUS): Status: ACTIVE | Noted: 2025-02-06

## 2025-02-06 ASSESSMENT — PROMIS GLOBAL HEALTH SCALE
EMOTIONAL_PROBLEMS: NEVER
RATE_MENTAL_HEALTH: GOOD
CARRYOUT_PHYSICAL_ACTIVITIES: MOSTLY
RATE_AVERAGE_FATIGUE: MILD
RATE_QUALITY_OF_LIFE: VERY GOOD
RATE_SOCIAL_SATISFACTION: VERY GOOD
RATE_PHYSICAL_HEALTH: GOOD
RATE_GENERAL_HEALTH: GOOD
CARRYOUT_SOCIAL_ACTIVITIES: VERY GOOD
RATE_AVERAGE_PAIN: 3

## 2025-02-07 ENCOUNTER — APPOINTMENT (OUTPATIENT)
Dept: PRIMARY CARE | Facility: CLINIC | Age: 55
End: 2025-02-07
Payer: COMMERCIAL

## 2025-02-07 VITALS
HEART RATE: 56 BPM | WEIGHT: 166 LBS | HEIGHT: 70 IN | OXYGEN SATURATION: 95 % | DIASTOLIC BLOOD PRESSURE: 76 MMHG | BODY MASS INDEX: 23.77 KG/M2 | TEMPERATURE: 98.2 F | SYSTOLIC BLOOD PRESSURE: 112 MMHG

## 2025-02-07 DIAGNOSIS — E78.5 HYPERLIPIDEMIA, UNSPECIFIED HYPERLIPIDEMIA TYPE: ICD-10-CM

## 2025-02-07 DIAGNOSIS — G89.29 CHRONIC BILATERAL LOW BACK PAIN WITHOUT SCIATICA: ICD-10-CM

## 2025-02-07 DIAGNOSIS — L65.9 ALOPECIA: ICD-10-CM

## 2025-02-07 DIAGNOSIS — T48.5X5A RHINITIS MEDICAMENTOSA: ICD-10-CM

## 2025-02-07 DIAGNOSIS — D68.51 FACTOR V LEIDEN (MULTI): ICD-10-CM

## 2025-02-07 DIAGNOSIS — J31.0 RHINITIS MEDICAMENTOSA: ICD-10-CM

## 2025-02-07 DIAGNOSIS — R76.8 ELEVATED SERUM IMMUNOGLOBULIN FREE LIGHT CHAINS: ICD-10-CM

## 2025-02-07 DIAGNOSIS — M54.2 NECK PAIN: ICD-10-CM

## 2025-02-07 DIAGNOSIS — G47.00 INSOMNIA, UNSPECIFIED TYPE: ICD-10-CM

## 2025-02-07 DIAGNOSIS — I26.99 PULMONARY EMBOLISM, OTHER, UNSPECIFIED CHRONICITY, UNSPECIFIED WHETHER ACUTE COR PULMONALE PRESENT (MULTI): ICD-10-CM

## 2025-02-07 DIAGNOSIS — D47.2 MONOCLONAL GAMMOPATHY OF UNKNOWN SIGNIFICANCE (MGUS): ICD-10-CM

## 2025-02-07 DIAGNOSIS — Z00.00 ENCOUNTER FOR WELLNESS EXAMINATION IN ADULT: Primary | ICD-10-CM

## 2025-02-07 DIAGNOSIS — D64.9 ANEMIA, UNSPECIFIED TYPE: ICD-10-CM

## 2025-02-07 DIAGNOSIS — M54.50 CHRONIC BILATERAL LOW BACK PAIN WITHOUT SCIATICA: ICD-10-CM

## 2025-02-07 DIAGNOSIS — G62.9 NEUROPATHY: ICD-10-CM

## 2025-02-07 DIAGNOSIS — F43.20 ADJUSTMENT DISORDER, UNSPECIFIED TYPE: ICD-10-CM

## 2025-02-07 PROCEDURE — UHSPHYS PR UH SELECT PHYSICAL: Performed by: INTERNAL MEDICINE

## 2025-02-07 PROCEDURE — 3008F BODY MASS INDEX DOCD: CPT | Performed by: INTERNAL MEDICINE

## 2025-02-07 RX ORDER — BISMUTH SUBSALICYLATE 262 MG
1 TABLET,CHEWABLE ORAL DAILY
COMMUNITY

## 2025-02-07 RX ORDER — MULTIVITAMIN/IRON/FOLIC ACID 18MG-0.4MG
1 TABLET ORAL DAILY
COMMUNITY

## 2025-02-07 RX ORDER — OXYMETAZOLINE HCL 0.05 %
2 SPRAY, NON-AEROSOL (ML) NASAL NIGHTLY
COMMUNITY

## 2025-02-07 NOTE — PROGRESS NOTES
Physical Exam    Name Lawrence Grier    Date of Service :2/6/2025    Lawrence Grier is a 54 y.o. year old male who is being seen for a comprehensive exam    His main health concerns today  establish  Top things on his mind.   Neuropathy: hot legs with lying down. Some tingling in fingertips for years. Started taking gabapentin. 1 a day for 3 weeks. Legs neuropathy in legs not getting worse. 18-24 months.   Low back crankiness.   Base of skull constant ache. Injury in highschool.   1. Encounter for wellness examination in adult (Primary)  Colonoscopy 2021. No polyps . Repeat 2031    2. Factor V Leiden (Multi)  history    3. Pulmonary embolism, other, unspecified chronicity, unspecified whether acute cor pulmonale present (Multi)  nt: 2013 first PE. 12/2023 dx with multiple PE. factor v leiden     4. Anemia, unspecified type      5. Elevated serum immunoglobulin free light chains  Recently seen by hem onc. Considered mgus at this time. Follow up with hem onc coming up.     6. Chronic bilateral low back pain without sciatica      7. Neuropathy  Polyneuropathy and still being worked up. Will be seeig neuromuscular neurologist.     8. Monoclonal gammopathy of unknown significance (MGUS)  free kappa lightchain found. 9/2024 bone survey negative. no M protein   Fu with hem onc coming up.   9. Alopecia. Feels like when he started finasteride and body may have changed with flabby, muscle mass. Some ED. Uses cialis once a seek.   10. 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   Patient Active Problem List   Diagnosis    Pulmonary embolus    Androgenic alopecia    Factor V Leiden (Multi)    Routine health maintenance    Other fatigue    Respiratory infection    Anemia    Nocturia    Myalgia    Neck pain    Chronic bilateral low back pain without sciatica    Neuropathy    Elevated serum immunoglobulin free light chains    Anxiety    Monoclonal gammopathy of unknown significance (MGUS)        Past Medical History:   Diagnosis Date     Androgenic alopecia     Factor V Leiden (Multi)     Pulmonary embolus     2 separate unprovoked PE        Past Surgical History:   Procedure Laterality Date    LUMBAR EPIDURAL INJECTION          Social History     Tobacco Use    Smoking status: Never    Smokeless tobacco: Never   Vaping Use    Vaping status: Never Used   Substance Use Topics    Alcohol use: Yes     Alcohol/week: 7.0 standard drinks of alcohol     Types: 7 Cans of beer per week     Comment: 1 beer per day    Drug use: Never      Social History     Social History Narrative    Distance running 5 mi 3-4d per week     3 kids.     Arie in med school. [email or web address]     Family History   Problem Relation Name Age of Onset    Breast cancer Mother Darleen Grier     No Known Problems Father      No Known Problems Sister      No Known Problems Sister      No Known Problems Daughter          Jr in College    No Known Problems Son      No Known Problems Son          Medical school - [employer]          Current Outpatient Medications:     albuterol 90 mcg/actuation inhaler, Inhale 2 puffs every 6 hours if needed for wheezing., Disp: 18 g, Rfl: 1    apixaban (Eliquis) 5 mg tablet, Take 1 tablet (5 mg) by mouth 2 times a day., Disp: 180 tablet, Rfl: 0    diazePAM (Valium) 10 mg tablet, Take 30 min prior to flight., Disp: 24 tablet, Rfl: 0    finasteride (Propecia) 1 mg tablet, TAKE ONE TABLET BY MOUTH ONCE DAILY, Disp: 90 tablet, Rfl: 3    gabapentin (Neurontin) 100 mg capsule, Take 1 capsule (100 mg) by mouth once daily at bedtime for 3 days, THEN 2 capsules (200 mg) once daily at bedtime for 3 days, THEN 3 capsules (300 mg) once daily at bedtime for 24 days., Disp: 81 capsule, Rfl: 1    tadalafil (Cialis) 2.5 mg tablet, Take 1 tablet (2.5 mg) by mouth once daily., Disp: , Rfl:     No Known Allergies      Review of Systems     There were no vitals taken for this visit. There is no height or weight on file to calculate BMI.    Physical Exam    RESULTS/DATA:  Reviewed Standard  Labs for this physical with patient ( any significant issues addressed in A/P )     ECG: sinus      Assessment/Plan   1. Encounter for wellness examination in adult (Primary)    - ECG 12 lead (Clinic Performed)    2. Factor V Leiden (Multi)  On meds    3. Pulmonary embolism, other, unspecified chronicity, unspecified whether acute cor pulmonale present (Multi)  Long term.     4. Anemia, unspecified type    - CBC; Future  - CBC    5. Elevated serum immunoglobulin free light chains  Recheck to see if going in right direction and fu with hem onc.   - [place]/Lambda Free Light Chain, Serum; Future  - Comprehensive Metabolic Panel; Future  - [place]/Lambda Free Light Chain, Serum  - Comprehensive Metabolic Panel    6. Chronic bilateral low back pain without sciatica  Xrays of neck and low back. [place], [address], or u  - XR lumbar spine 2-3 views; Future    7. Neuropathy  For the neuropathy, ok to try to increase as tolerated. Would try 200mg at night and increase to 300mg at night if doing well. Looking for improvements in neuropathy of the legs, and side effect of better sleep.    8. Monoclonal gammopathy of unknown significance (MGUS)  Lets see what oncology and neurology says and we will fu with a virtual late march.   - Serum Protein Electrophoresis; Future  - Serum Protein Electrophoresis    9. Adjustment disorder, unspecified type  Continue to discuss. Does neurontin help.     10. Rhinitis medicamentosa      11. Insomnia, unspecified type  neurontin    12. Neck pain    - XR cervical spine 2-3 views; Future    13. Alopecia  Switch to topical  - minoxidiL-finasteride 7-0.1 % solution; Apply 1 Application topically 2 times a day.  Dispense: 30 g; Refill: 11    14. Hyperlipidemia, unspecified hyperlipidemia type  Will get labs      Sage Wells,

## 2025-02-07 NOTE — PATIENT INSTRUCTIONS
Topical hair loss meds.   Cialis ok to go 2 tablets to see if improves and let me know.   For the neuropathy, ok to try to increase as tolerated. Would try 200mg at night and increase to 300mg at night if doing well. Looking for improvements in neuropathy of the legs, and side effect of better sleep.   Lets see what oncology and neurology says and we will fu with a virtual late march.   Get blood work for me  Xrays of neck and low back. [place], [address], or [facility]  Keep me posted.!

## 2025-02-10 ENCOUNTER — TELEPHONE (OUTPATIENT)
Dept: PRIMARY CARE | Facility: CLINIC | Age: 55
End: 2025-02-10
Payer: COMMERCIAL

## 2025-02-10 NOTE — TELEPHONE ENCOUNTER
Pharmacy called and they don't carry minoxidil-finasteride 7-0.1% solution.     Pharmacist stated she doesn't have any combination for minoxidil.     Please advise

## 2025-02-11 NOTE — TELEPHONE ENCOUNTER
Gave a verbal order to [facility] compound pharmacy for minoxidil-finasteride 7-0.1% solution. Apply 1 ml to scalp twice a day.     Pharmacy will contact patient when script is ready.     I notified patient

## 2025-02-14 ENCOUNTER — LAB (OUTPATIENT)
Dept: LAB | Facility: HOSPITAL | Age: 55
End: 2025-02-14
Payer: COMMERCIAL

## 2025-02-14 DIAGNOSIS — D64.9 ANEMIA, UNSPECIFIED: Primary | ICD-10-CM

## 2025-02-14 LAB
25(OH)D3 SERPL-MCNC: 38 NG/ML (ref 30–100)
ALBUMIN SERPL BCP-MCNC: 4.6 G/DL (ref 3.4–5)
ALP SERPL-CCNC: 44 U/L (ref 33–120)
ALT SERPL W P-5'-P-CCNC: 22 U/L (ref 10–52)
ANION GAP SERPL CALC-SCNC: 14 MMOL/L (ref 10–20)
APPEARANCE UR: CLEAR
AST SERPL W P-5'-P-CCNC: 24 U/L (ref 9–39)
BILIRUB SERPL-MCNC: 0.7 MG/DL (ref 0–1.2)
BILIRUB UR STRIP.AUTO-MCNC: NEGATIVE MG/DL
BUN SERPL-MCNC: 20 MG/DL (ref 6–23)
CALCIUM SERPL-MCNC: 10 MG/DL (ref 8.6–10.6)
CHLORIDE SERPL-SCNC: 106 MMOL/L (ref 98–107)
CHOLEST SERPL-MCNC: 218 MG/DL (ref 0–199)
CHOLESTEROL/HDL RATIO: 3.2
CO2 SERPL-SCNC: 27 MMOL/L (ref 21–32)
COLOR UR: YELLOW
CREAT SERPL-MCNC: 0.87 MG/DL (ref 0.5–1.3)
CRP SERPL HS-MCNC: 0.3 MG/L
EGFRCR SERPLBLD CKD-EPI 2021: >90 ML/MIN/1.73M*2
ERYTHROCYTE [DISTWIDTH] IN BLOOD BY AUTOMATED COUNT: 12.9 % (ref 11.5–14.5)
EST. AVERAGE GLUCOSE BLD GHB EST-MCNC: 111 MG/DL
GLUCOSE SERPL-MCNC: 100 MG/DL (ref 74–99)
GLUCOSE UR STRIP.AUTO-MCNC: NORMAL MG/DL
HBA1C MFR BLD: 5.5 %
HCT VFR BLD AUTO: 43.1 % (ref 41–52)
HDLC SERPL-MCNC: 67.4 MG/DL
HGB BLD-MCNC: 13.8 G/DL (ref 13.5–17.5)
KETONES UR STRIP.AUTO-MCNC: NEGATIVE MG/DL
LDLC SERPL CALC-MCNC: 128 MG/DL
LEUKOCYTE ESTERASE UR QL STRIP.AUTO: NEGATIVE
MCH RBC QN AUTO: 29.9 PG (ref 26–34)
MCHC RBC AUTO-ENTMCNC: 32 G/DL (ref 32–36)
MCV RBC AUTO: 93 FL (ref 80–100)
NITRITE UR QL STRIP.AUTO: NEGATIVE
NON HDL CHOLESTEROL: 151 MG/DL (ref 0–149)
NRBC BLD-RTO: 0 /100 WBCS (ref 0–0)
PH UR STRIP.AUTO: 5 [PH]
PLATELET # BLD AUTO: 222 X10*3/UL (ref 150–450)
POTASSIUM SERPL-SCNC: 4.7 MMOL/L (ref 3.5–5.3)
PROT SERPL-MCNC: 7.2 G/DL (ref 6.4–8.2)
PROT SERPL-MCNC: 7.2 G/DL (ref 6.4–8.2)
PROT UR STRIP.AUTO-MCNC: NEGATIVE MG/DL
PSA SERPL-MCNC: 0.4 NG/ML
RBC # BLD AUTO: 4.62 X10*6/UL (ref 4.5–5.9)
RBC # UR STRIP.AUTO: NEGATIVE MG/DL
SODIUM SERPL-SCNC: 142 MMOL/L (ref 136–145)
SP GR UR STRIP.AUTO: 1.02
TRIGL SERPL-MCNC: 111 MG/DL (ref 0–149)
TSH SERPL-ACNC: 2.64 MIU/L (ref 0.44–3.98)
UROBILINOGEN UR STRIP.AUTO-MCNC: NORMAL MG/DL
VLDL: 22 MG/DL (ref 0–40)
WBC # BLD AUTO: 4.7 X10*3/UL (ref 4.4–11.3)

## 2025-02-14 PROCEDURE — 84443 ASSAY THYROID STIM HORMONE: CPT

## 2025-02-14 PROCEDURE — 85027 COMPLETE CBC AUTOMATED: CPT

## 2025-02-14 PROCEDURE — 83036 HEMOGLOBIN GLYCOSYLATED A1C: CPT

## 2025-02-14 PROCEDURE — 84153 ASSAY OF PSA TOTAL: CPT

## 2025-02-14 PROCEDURE — 81003 URINALYSIS AUTO W/O SCOPE: CPT

## 2025-02-14 PROCEDURE — 83521 IG LIGHT CHAINS FREE EACH: CPT

## 2025-02-14 PROCEDURE — 86141 C-REACTIVE PROTEIN HS: CPT

## 2025-02-14 PROCEDURE — 80053 COMPREHEN METABOLIC PANEL: CPT

## 2025-02-14 PROCEDURE — 84155 ASSAY OF PROTEIN SERUM: CPT

## 2025-02-14 PROCEDURE — 80061 LIPID PANEL: CPT

## 2025-02-14 PROCEDURE — 82306 VITAMIN D 25 HYDROXY: CPT

## 2025-02-15 LAB — HOLD SPECIMEN: NORMAL

## 2025-02-16 LAB
KAPPA LC SERPL-MCNC: 1.85 MG/DL (ref 0.33–1.94)
KAPPA LC/LAMBDA SER: 1.28 {RATIO} (ref 0.26–1.65)
LAMBDA LC SERPL-MCNC: 1.45 MG/DL (ref 0.57–2.63)

## 2025-02-18 ENCOUNTER — OFFICE VISIT (OUTPATIENT)
Dept: HEMATOLOGY/ONCOLOGY | Facility: CLINIC | Age: 55
End: 2025-02-18
Payer: COMMERCIAL

## 2025-02-18 VITALS
OXYGEN SATURATION: 100 % | DIASTOLIC BLOOD PRESSURE: 78 MMHG | WEIGHT: 172.18 LBS | TEMPERATURE: 97.9 F | RESPIRATION RATE: 18 BRPM | BODY MASS INDEX: 25.06 KG/M2 | HEART RATE: 57 BPM | SYSTOLIC BLOOD PRESSURE: 125 MMHG

## 2025-02-18 DIAGNOSIS — D69.6 THROMBOCYTOPENIA (CMS-HCC): ICD-10-CM

## 2025-02-18 DIAGNOSIS — D47.2 MONOCLONAL GAMMOPATHY OF UNKNOWN SIGNIFICANCE (MGUS): ICD-10-CM

## 2025-02-18 DIAGNOSIS — R76.8 ELEVATED SERUM IMMUNOGLOBULIN FREE LIGHT CHAINS: ICD-10-CM

## 2025-02-18 LAB
ALBUMIN: 4.5 G/DL (ref 3.4–5)
ALPHA 1 GLOBULIN: 0.2 G/DL (ref 0.2–0.6)
ALPHA 2 GLOBULIN: 0.6 G/DL (ref 0.4–1.1)
BETA GLOBULIN: 0.8 G/DL (ref 0.5–1.2)
GAMMA GLOBULIN: 1.1 G/DL (ref 0.5–1.4)
PATH REVIEW-SERUM PROTEIN ELECTROPHORESIS: NORMAL
PROTEIN ELECTROPHORESIS COMMENT: NORMAL

## 2025-02-18 PROCEDURE — 99214 OFFICE O/P EST MOD 30 MIN: CPT | Performed by: INTERNAL MEDICINE

## 2025-02-18 PROCEDURE — 1036F TOBACCO NON-USER: CPT | Performed by: INTERNAL MEDICINE

## 2025-02-18 ASSESSMENT — COLUMBIA-SUICIDE SEVERITY RATING SCALE - C-SSRS
6. HAVE YOU EVER DONE ANYTHING, STARTED TO DO ANYTHING, OR PREPARED TO DO ANYTHING TO END YOUR LIFE?: NO
1. IN THE PAST MONTH, HAVE YOU WISHED YOU WERE DEAD OR WISHED YOU COULD GO TO SLEEP AND NOT WAKE UP?: NO
2. HAVE YOU ACTUALLY HAD ANY THOUGHTS OF KILLING YOURSELF?: NO

## 2025-02-18 ASSESSMENT — PATIENT HEALTH QUESTIONNAIRE - PHQ9
2. FEELING DOWN, DEPRESSED OR HOPELESS: NOT AT ALL
SUM OF ALL RESPONSES TO PHQ9 QUESTIONS 1 AND 2: 0
1. LITTLE INTEREST OR PLEASURE IN DOING THINGS: NOT AT ALL

## 2025-02-18 ASSESSMENT — PAIN SCALES - GENERAL: PAINLEVEL_OUTOF10: 0-NO PAIN

## 2025-02-18 NOTE — PROGRESS NOTES
Patient ID: Lawrence Grier is a 54 y.o. male.  Referring Physician: Elmer Powers MD  [address] [facility], [place] [address]  Primary Care Provider: Sage Wells DO  Visit Type: Initial Visit  The patient was referred to me for further evaluation and management of mildly elevated free kappa light chains at 2.14 mg/dL reference range 0.33-1.94 mg/dL  Bone survey on September 5, 2024 did not reveal any evidence of osteolytic lesions suggesting free kappa light chain MGUS.    Subjective    HPI  The patient is a 54-year-old man was evaluated by Dr. Coffey,PCP.  The patient had presented with longstanding history of low back pain and since last few months has noticed burning sensation in thighs as well as arms.  Somewhat so that the patient has to put a pillow between the 2 legs as well as very short at night so the skin does not drop to experience the symptoms.  Extensively evaluated.  CBC on July 17, 2024 revealed WBC 5.8, hemoglobin 14 g/dL, platelets 20 20,000/mm³.  Differential count revealed 69% neutrophils 18% lymphocytes 12% monocytes.  Calcium, BUN, creatinine all in reference range.  The patient was referred to hematology services as well as neurology services for possible EMG nerve conduction studies.    At interview on September 4, 2024 the patient narrated entire history.  Denied history of weight loss, fevers, night sweats, chest pain, shortness of breath, nausea, vomiting, hematemesis, melena, hematochezia and hematuria.    The patient had come for follow-up on September 25, 2024 regarding history of elevated free kappa light chains.  The patient has a past history of chronic low back pain.  Anxious to know results of the test performed.    The patient had come for follow-up on February 18, 2025 regarding history of elevated free kappa light chains.  Rectal surgery was negative.  Recent most free light chain analysis on February 14, 2025 revealed free kappa light chains 1.85  mg/dL reference range 0.33-1.94 mg/dL.    Past medical history pulmonary emboli in 2013 and in 2023.  Evaluation revealed factor V Leiden mutation.  Receiving long-term anticoagulation using Eliquis.    BRCA2 positive.,  Androgenic alopecia,.    Past surgical history:    Reviewed but not relevant.    Colonoscopy:    May 2022.    Family history:    Mother had breast cancer.      Review of Systems   All other systems reviewed and are negative.       Objective   BSA: 1.96 meters squared  /78   Pulse 57   Temp 36.6 °C (97.9 °F)   Resp 18   Wt 78.1 kg (172 lb 2.9 oz)   SpO2 100%   BMI 25.06 kg/m²      has a past medical history of Androgenic alopecia, Factor V Leiden (Multi), and Pulmonary embolus.   has a past surgical history that includes Lumbar epidural injection.  Family History   Problem Relation Name Age of Onset    Breast cancer Mother Darleen Grier     Atrial fibrillation Father      No Known Problems Sister      No Known Problems Sister      No Known Problems Daughter          Jr in College    No Known Problems Son      No Known Problems Son          Medical school - [employer]     Oncology History    No history exists.       Lawrence Grier  reports that he has never smoked. He has never used smokeless tobacco.  He  reports current alcohol use of about 7.0 standard drinks of alcohol per week.  He  reports no history of drug use.    Physical Exam  Constitutional:       Appearance: Normal appearance.   HENT:      Head: Normocephalic and atraumatic.      Nose: Nose normal.      Mouth/Throat:      Mouth: Mucous membranes are moist.      Pharynx: Oropharynx is clear.   Eyes:      Extraocular Movements: Extraocular movements intact.      Conjunctiva/sclera: Conjunctivae normal.      Pupils: Pupils are equal, round, and reactive to light.   Cardiovascular:      Rate and Rhythm: Normal rate and regular rhythm.   Pulmonary:      Effort: Pulmonary effort is normal.      Breath sounds: Normal breath sounds.   Abdominal:       General: Abdomen is flat. Bowel sounds are normal.      Palpations: Abdomen is soft.   Musculoskeletal:         General: Normal range of motion.      Cervical back: Normal range of motion and neck supple.   Neurological:      General: No focal deficit present.      Mental Status: He is alert and oriented to person, place, and time. Mental status is at baseline.   Psychiatric:         Mood and Affect: Mood normal.         Behavior: Behavior normal.         Thought Content: Thought content normal.         Judgment: Judgment normal.         WBC   Date/Time Value Ref Range Status   02/14/2025 07:59 AM 4.7 4.4 - 11.3 x10*3/uL Final   11/15/2024 02:24 PM 6.0 4.4 - 11.3 x10*3/uL Final   07/17/2024 09:11 AM 5.8 4.4 - 11.3 x10*3/uL Final     nRBC   Date Value Ref Range Status   02/14/2025 0.0 0.0 - 0.0 /100 WBCs Final   11/15/2024 0.0 0.0 - 0.0 /100 WBCs Final   07/17/2024 0.0 0.0 - 0.0 /100 WBCs Final     RBC   Date Value Ref Range Status   02/14/2025 4.62 4.50 - 5.90 x10*6/uL Final   11/15/2024 4.79 4.50 - 5.90 x10*6/uL Final   07/17/2024 4.69 4.50 - 5.90 x10*6/uL Final     Hemoglobin   Date Value Ref Range Status   02/14/2025 13.8 13.5 - 17.5 g/dL Final   11/15/2024 14.3 13.5 - 17.5 g/dL Final   07/17/2024 14.0 13.5 - 17.5 g/dL Final     Hematocrit   Date Value Ref Range Status   02/14/2025 43.1 41.0 - 52.0 % Final   11/15/2024 43.9 41.0 - 52.0 % Final   07/17/2024 42.9 41.0 - 52.0 % Final     MCV   Date/Time Value Ref Range Status   02/14/2025 07:59 AM 93 80 - 100 fL Final   11/15/2024 02:24 PM 92 80 - 100 fL Final   07/17/2024 09:11 AM 92 80 - 100 fL Final     MCH   Date/Time Value Ref Range Status   02/14/2025 07:59 AM 29.9 26.0 - 34.0 pg Final   11/15/2024 02:24 PM 29.9 26.0 - 34.0 pg Final   07/17/2024 09:11 AM 29.9 26.0 - 34.0 pg Final     MCHC   Date/Time Value Ref Range Status   02/14/2025 07:59 AM 32.0 32.0 - 36.0 g/dL Final   11/15/2024 02:24 PM 32.6 32.0 - 36.0 g/dL Final   07/17/2024 09:11 AM 32.6 32.0 -  "36.0 g/dL Final     RDW   Date/Time Value Ref Range Status   02/14/2025 07:59 AM 12.9 11.5 - 14.5 % Final   11/15/2024 02:24 PM 12.5 11.5 - 14.5 % Final   07/17/2024 09:11 AM 12.9 11.5 - 14.5 % Final     Platelets   Date/Time Value Ref Range Status   02/14/2025 07:59  150 - 450 x10*3/uL Final   11/15/2024 02:24  150 - 450 x10*3/uL Final   07/17/2024 09:11  150 - 450 x10*3/uL Final     No results found for: \"MPV\"  Neutrophils %   Date/Time Value Ref Range Status   11/15/2024 02:24 PM 64.2 40.0 - 80.0 % Final   07/17/2024 09:11 AM 69.1 40.0 - 80.0 % Final   05/28/2024 07:32 AM 68.3 40.0 - 80.0 % Final     Immature Granulocytes %, Automated   Date/Time Value Ref Range Status   11/15/2024 02:24 PM 0.3 0.0 - 0.9 % Final     Comment:     Immature Granulocyte Count (IG) includes promyelocytes, myelocytes and metamyelocytes but does not include bands. Percent differential counts (%) should be interpreted in the context of the absolute cell counts (cells/UL).   07/17/2024 09:11 AM 0.5 0.0 - 0.9 % Final     Comment:     Immature Granulocyte Count (IG) includes promyelocytes, myelocytes and metamyelocytes but does not include bands. Percent differential counts (%) should be interpreted in the context of the absolute cell counts (cells/UL).   05/28/2024 07:32 AM 0.1 0.0 - 0.9 % Final     Comment:     Immature Granulocyte Count (IG) includes promyelocytes, myelocytes and metamyelocytes but does not include bands. Percent differential counts (%) should be interpreted in the context of the absolute cell counts (cells/UL).     Lymphocytes %   Date/Time Value Ref Range Status   11/15/2024 02:24 PM 27.0 13.0 - 44.0 % Final   07/17/2024 09:11 AM 18.1 13.0 - 44.0 % Final   05/28/2024 07:32 AM 23.0 13.0 - 44.0 % Final     Monocytes %   Date/Time Value Ref Range Status   11/15/2024 02:24 PM 7.2 2.0 - 10.0 % Final   07/17/2024 09:11 AM 11.7 2.0 - 10.0 % Final   05/28/2024 07:32 AM 7.3 2.0 - 10.0 % Final     Eosinophils % "   Date/Time Value Ref Range Status   11/15/2024 02:24 PM 1.0 0.0 - 6.0 % Final   07/17/2024 09:11 AM 0.3 0.0 - 6.0 % Final   05/28/2024 07:32 AM 0.9 0.0 - 6.0 % Final     Basophils %   Date/Time Value Ref Range Status   11/15/2024 02:24 PM 0.3 0.0 - 2.0 % Final   07/17/2024 09:11 AM 0.3 0.0 - 2.0 % Final   05/28/2024 07:32 AM 0.4 0.0 - 2.0 % Final     Neutrophils Absolute   Date/Time Value Ref Range Status   11/15/2024 02:24 PM 3.82 1.20 - 7.70 x10*3/uL Final     Comment:     Percent differential counts (%) should be interpreted in the context of the absolute cell counts (cells/uL).   07/17/2024 09:11 AM 3.97 1.20 - 7.70 x10*3/uL Final     Comment:     Percent differential counts (%) should be interpreted in the context of the absolute cell counts (cells/uL).   05/28/2024 07:32 AM 5.25 1.20 - 7.70 x10*3/uL Final     Comment:     Percent differential counts (%) should be interpreted in the context of the absolute cell counts (cells/uL).     Immature Granulocytes Absolute, Automated   Date/Time Value Ref Range Status   11/15/2024 02:24 PM 0.02 0.00 - 0.70 x10*3/uL Final   07/17/2024 09:11 AM 0.03 0.00 - 0.70 x10*3/uL Final   05/28/2024 07:32 AM 0.01 0.00 - 0.70 x10*3/uL Final     Lymphocytes Absolute   Date/Time Value Ref Range Status   11/15/2024 02:24 PM 1.61 1.20 - 4.80 x10*3/uL Final   07/17/2024 09:11 AM 1.04 (L) 1.20 - 4.80 x10*3/uL Final   05/28/2024 07:32 AM 1.77 1.20 - 4.80 x10*3/uL Final     Monocytes Absolute   Date/Time Value Ref Range Status   11/15/2024 02:24 PM 0.43 0.10 - 1.00 x10*3/uL Final   07/17/2024 09:11 AM 0.67 0.10 - 1.00 x10*3/uL Final   05/28/2024 07:32 AM 0.56 0.10 - 1.00 x10*3/uL Final     Eosinophils Absolute   Date/Time Value Ref Range Status   11/15/2024 02:24 PM 0.06 0.00 - 0.70 x10*3/uL Final   07/17/2024 09:11 AM 0.02 0.00 - 0.70 x10*3/uL Final   05/28/2024 07:32 AM 0.07 0.00 - 0.70 x10*3/uL Final     Basophils Absolute   Date/Time Value Ref Range Status   11/15/2024 02:24 PM 0.02  "0.00 - 0.10 x10*3/uL Final   07/17/2024 09:11 AM 0.02 0.00 - 0.10 x10*3/uL Final   05/28/2024 07:32 AM 0.03 0.00 - 0.10 x10*3/uL Final       No components found for: \"PT\"  No results found for: \"APTT\"    Assessment/Plan         Diagnoses and all orders for this visit:  Elevated serum immunoglobulin free light chains  -     Referral to Hematology and Oncology  -     XR skeletal survey complete; Future  -     TSH with reflex to Free T4 if abnormal; Future  -     Vitamin B12; Future  -     Folate; Future  -     Clinic Appointment Request (review labs and bone survey from [facility]); Future  -     Lavender Top; Future         Elmer Powers MD  The patient is a 54-year-old man with past history of pulmonary emboli, factor V Leiden mutation, BRCA2 mutation positive was referred for further evaluation and management of slightly elevated free kappa light chains at 2.14 mg/dL reference range 0.33-1.94 mg/dL.  Physical examination within normal limits.  I had a detailed discussion with the patient explained to him the significance of mildly elevated free kappa light chains.  Differential diagnosis includes MGUS, light chain multiple myeloma, nonsecretory multiple myeloma.  Serum protein electrophoresis did not reveal M protein.  I have recommended a bone survey.  If there are no osteolytic lesions it is less likely the patient has multiple myeloma.  Incidentally 10 to 15% of patients with M protein/free light chains have peripheral neuropathy.  The patient has an appoint for EMG, nerve conduction studies.  The patient understood appreciated all the details provided and was grateful.  The patient had come for follow-up on September 25, 2024 regarding history of elevated free kappa light chains.  The patient has a past history of chronic low back pain.  Bone survey on September 5, 2024 did not reveal any evidence of osteolytic lesion.  There was spondylolisthesis on L2-3.  Had a detailed discussion with the patient and explained to " him that this is suggestive of MGUS and would recommend clinical follow-up.  Could also consider bone marrow aspiration and biopsy the patient would like to think about it.  Follow clinically return in 6 months.  The patient is pleased.    The patient had come for follow-up on February 18, 2025 regarding history of elevated free kappa light chains.  Rectal surgery was negative.  Recent most free light chain analysis on February 14, 2025 revealed free kappa light chains 1.85 mg/dL reference range 0.33-1.94 mg/dL.    Thank you for allowing me to participate in care of your patient if you have any questions please feel free to call me.

## 2025-03-01 NOTE — PROGRESS NOTES
"OUTPATIENT CLINIC NOTE    History of Present Illness: Lawrence Grier is a 54 y.o. 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 Peña for neuropathy in setting of elevated free light chains.    Pertinent history:  Last seen with Dr. Peña 1/21/25  Patient complains of \"heat\" in both legs (thighs and upper part of legs but not feet) for 2 years, which is stable. Also notice pin and needle sensation in the plantar surface of both feet for a couple of months. Denies any weakness. History notable for chronic small fiber neuropathy with physical examination shows length-dependent glove stocking pattern loss of cold sensation in BLE. EMG showed sensorimotor, axonal periperal neuropathy with mild left peroneal mononeuropathy and left L4 radiculopathy. The patient symptoms are attributed to polyneuropathy. Workup with HbA1C, copper, vitamin E, vitamin B1, vitamin B6 and referred to neuromuscular for further recommendations, especially if the patient needs further workup with bone marrow biopsy. He is also started on gabapentin for symptomatic treatment    Of note, the patient has seen oncologist Dr. Powers for elevated serum immunoglobulin free light chains. Bone survey shows no evidence of osteolytic lesion. Last follow up with Dr. Powers 2/18/25. Recent free light analysis (2/14/25) showed normal, free kappa light chains 1.85 mg/dL reference range 0.33-1.94 mg/dL. SPEP (2/18/25) normal. The patient also has back pain without radiation.    Pertinent workup:   - TSH (2/2025): 2.64 WNL  - Vit B12 (9/2024): 591 WNL  - Folate (9/2024): >24 WNL  - Urine heavy metal (11/2024): neg  - Anti SSA, SSB (11/2024): neg  - JAVIER (7/2024): neg  - RF, ESR (7/2024): neg  - Keppa light chain: 2.13 (H, 7/2024) --> 1.95 (H, 11/2024) --> 1.85 (WNL, " 2/2025)  - SPEP (11/2024): normal --> (2/2025): normal  - HbA1C (2/2025): 5.5  - Pending labs for: copper, vitamin E, vitamin B1, vitamin B6  - EMG (11/4/24): The study is abnormal. There is electrophysiological evidence for a sensorimotor, axonal periperal neuropathy with additional mild peroneal mononeuropathy at the left fibular head. There is electrophysiological evidence suggestive of an additional left L4 radiculopathy. There is no definite electrophysiologial evidence for a lumbosacral plexopathy in either leg.    Present history:  Patient presents for initial visit for concern for neuropathy iso elevated serum immunoglobulin free light chains.    He endorses having heat sensation in both of his thighs and upper leg. No involvement of feet. The heat sensation is stable, may be improving a little with gabapentin. The patient still uses the pillow to avoid his legs touching. The sensation is episodic throughout the day, although the patient notice more at night team due to exposed skin. The episodes last for hours.    He also endorse tingling in the fingertips for about 5 years or longer. This is stable, not progressing. The tingling is in the distal 1/3 of all fingers of both hands, happening all the time. No muscle weakness, denies any dropping of things.    Also endorse occasional feeling of pins and needles from the knee down on both sides for the past year. These feeling does not happen in the same place. Happens once a day, lasting seconds. Not troublesome to the patient.     The patient also has back pain for the past 4-6 years, the pain is getting better after he stopped running. Describe the pain as steady aching, aggravated with movement. PS 2/10. No electric-shock like pain, no radiation down the leg.    Denies any weakness, numbness, bowel or bladder issues.     He is currently taking gabapentin 100mg 1 tablet at night without noticeable side effect. He did not titrate up due to not wanting to add  more medication but is willing to try.     Last saw Dr. Powers, offered bone marrow biopsy as an option but he would like to think more about this.     ROS: All systems reviewed and were negative except as above    Home Medications:    Current Outpatient Medications   Medication Instructions    albuterol 90 mcg/actuation inhaler 2 puffs, inhalation, Every 6 hours PRN    apixaban (ELIQUIS) 5 mg, oral, 2 times daily    b complex 0.4 mg tablet 1 tablet, Daily    diazePAM (Valium) 10 mg tablet Take 30 min prior to flight.    finasteride (PROPECIA) 1 mg, oral, Daily    gabapentin (Neurontin) 100 mg capsule Take 1 capsule (100 mg) by mouth once daily at bedtime for 3 days, THEN 2 capsules (200 mg) once daily at bedtime for 3 days, THEN 3 capsules (300 mg) once daily at bedtime for 24 days.    minoxidiL-finasteride 7-0.1 % solution 1 Application, topical (top), 2 times daily    multivitamin tablet 1 tablet, Daily    oxymetazoline (Afrin) 0.05 % nasal spray 2 sprays, Nightly    tadalafil (CIALIS) 2.5 mg, Daily     Past Medical History:    has a past medical history of Androgenic alopecia, Factor V Leiden (Multi), and Pulmonary embolus.    Past Surgical History:    has a past surgical history that includes Lumbar epidural injection.    Allergies:   No Known Allergies    Family History:   Family History   Problem Relation Name Age of Onset    Breast cancer Mother Darleen Grier     Atrial fibrillation Father      No Known Problems Sister      No Known Problems Sister      No Known Problems Daughter          Jr in College    No Known Problems Son      No Known Problems Son          Medical school - [employer]     Denies family history of neurological disease.    Past Social History:   Social History     Tobacco Use    Smoking status: Never    Smokeless tobacco: Never   Vaping Use    Vaping status: Never Used   Substance Use Topics    Alcohol use: Yes     Alcohol/week: 7.0 standard drinks of alcohol     Types: 7 Cans of beer per week      Comment: 1 beer a day.    Drug use: Never     Alcohol: 1 beer can every day  No smoking, no illicit drug use    Vitals:   Temp:  [36.2 °C (97.1 °F)] 36.2 °C (97.1 °F)  Heart Rate:  [64] 64  Resp:  [18] 18  BP: (134)/(86) 134/86    Physical Exam:   General Appearance:  No distress, alert, interactive and cooperative.      Mental State: Orientation was normal to time, place and person. Recent and remote memory was intact.  Attention span and concentration were normal.     Cranial Nerves:   CN 2: Visual fields full to confrontation.   CN 3, 4, 6: Pupils round, 4 mm in diameter, equally reactive to light. Lids symmetric; no ptosis. EOMs normal alignment, full range with normal saccades, pursuit and convergence.   No nystagmus.   CN 5: Facial sensation intact bilaterally.   CN 7: Normal and symmetric facial strength. Nasolabial folds symmetric.   CN 8: Hearing intact to voice  CN 9: Palate elevates symmetrically.   CN 11: Normal strength of shoulder shrug and neck turning.   CN 12: Tongue midline, with normal bulk and strength; no fasciculations.     Motor: Muscle bulk and tone were normal in both upper and lower extremities. No fasciculations, tremor or other abnormal movements were present.     Strength: R L  Shoulder Abd 5 5  Shoulder Add 5 5  Elbow Flex  5 5  Elbow Ext  5 5    5 5  Hip flexion 5 5  Knee Ext      5 5  Knee Flex    5 5  DorsiFlex    5 5  PlantarFlex 5 5    Reflexes:  R L  Biceps  +1 +1  Brachioradialis +1 +1  Triceps  +1 +1  Patellar  +0 +0  Achilles +0  +0    Right Plantar: downgoing  Left Plantar: downgoing    Right pathological reflexes: Radha's absent. Ankle clonus absent.  Left pathological reflexes: Radha's absent. Ankle clonus absent.  No frontal release signs or other pathologic reflexes present.     Sensory: In both upper and lower extremities, sensation was intact to light touch.   Pinprick sensation: endorse more sensitivity in the feet. Possible loss of pinprick sensation in the  distal phalanges of the right 2nd and 3rd digit.   Cold sensation: loss of cold sensation in both feet up to mid leg.  Vibration: Mild loss of sensation on the both toes compare to lateral malleolus  Proprioception: Intact BLE    Coordination: In both upper extremities, finger-nose-finger was intact without dysmetria or overshoot. In both lower extremities, heel-to-shin was intact.     Gait: Station was stable with a normal base. Gait was stable with a normal arm swing and speed. No ataxia, shuffling, steppage or waddling was present. No circumduction was present. Tandem gait was intact. No Romberg sign was present. Able to do toe and heel walk without     Labs:                           BNP   Date/Time Value Ref Range Status   08/13/2023 02:20 PM 14 0 - 99 pg/mL Final     Comment:     .  <100 pg/mL - Heart failure unlikely  100-299 pg/mL - Intermediate probability of acute heart  .               failure exacerbation. Correlate with clinical  .               context and patient history.    >=300 pg/mL - Heart Failure likely. Correlate with clinical  .               context and patient history.  BNP testing is performed using different testing   methodology at [facility] than at other   [facility]. Direct result comparisons should   only be made within the same method.         Lab Results   Component Value Date    GLUCOSEU Normal 02/14/2025    BLOODU NEGATIVE 02/14/2025    PROTUR NEGATIVE 02/14/2025    NITRITEU NEGATIVE 02/14/2025    LEUKOCYTESU NEGATIVE 02/14/2025     Thyroid Stimulating Hormone   Date/Time Value Ref Range Status   02/14/2025 07:59 AM 2.64 0.44 - 3.98 mIU/L Final     Vitamin B12   Date/Time Value Ref Range Status   09/04/2024 11:47  211 - 911 pg/mL Final     Folate, Serum   Date/Time Value Ref Range Status   09/04/2024 11:47 AM >24.0 >5.0 ng/mL Final     Imaging:  No MRI head results found for the past 12 months  No CT head results found for the past 12 months    XR  skeletal survey 9/5/24  IMPRESSION:  No lytic lesion or pathologic fracture in the axilla pedicular  skeleton. Mild spondylosis and mild retrolisthesis at L2-L3.    Procedure:  EMG (11/4/24)  The study is abnormal. There is electrophysiological evidence for a sensorimotor, axonal periperal neuropathy with additional mild peroneal mononeuropathy at the left fibular head. There is electrophysiological evidence suggestive of an additional left L4 radiculopathy. There is no definite electrophysiologial evidence for a lumbosacral plexopathy in either leg.    Assessment/Recommendations  Lawrence Grier is a 54 y.o.  male with a PMHx of pulmonary emboli, factor V Leiden mutation, BRCA2 mutation positive, androgenic alopecia, anemia, elevated serum immunoglobulin free light chains, back pain, anxiety who presented as a referral from Dr. Peña for neuropathy iso elevated free light chains.    History notable for heat sensation in the thighs and upper legs, stable for the past 2 years. The symptoms could represent chronic small fiber neuropathy. One possible cause is paraproteinemia but will repeat workup with vitamin B12, MMA, ASRI and pending vitamin B6. However, the area of thigh and leg could be from radiculopathy, given that he has retrolisthesis at L2-L3 on bone survey. He also has glove-stocking pattern of loss of could sensation up to his legs with loss of reflexes in both patella and ankles, suggesting that there might be a component of small and large fiber neuropathy. Other symptoms include loss of pinprick sensation in distal right 2nd and 3rd digit, which might represent median nerve entrapment. Overall, the patient would benefit from EMG in both upper and lower extremities to tease out the component of generalized neuropathy, entrapment neurology, and radiculopathy. Will follow the result of EMG before considering other procedures such as QSART for small fiber neuropathy or bone marrow biopsy for further testing  of elevated free light chains.     Impression:    C/f Small and large fiber neuropathy, unknown etilogy, young age and transiently elevated light chains brings up autoimmune causes versus hematologic cause  C/f right median nerve entrapment    Plan/ Recommendation:   - Repeat EMG with upper and lower extremities, for further workup of large fiber neuropathy, c/f right median nerve entrapment  - Lab: B12, MMA, SARI  - Will follow copper, vitamin E, vitamin B1, vitamin B6 level  RTC to be placed after EMG performed    The patient has been seen, examined, and discussed with Dr. Andi Mckeon MD PhD  Neurology resident, PGY-2    -----------------------------------------------------------------------------------------------------------------------------  ATTENDING ATTESTATION    I saw patient with trainee and agree with the edits, history and exam that I helped formulate per above.    Concern due to the fact he has both hand and feet symptoms. Will do EMG study to evaluate for axonal versus demyelinating and if study shows a non-length dependent or length dependent with median neuropathy pattern.     Labwork for vitamin levels and SARI needs to be sent as Sjogren's can present in non length dependent pattern.    I personally spent 60 minutes on the day of the visit completing the review of the medical record and outside records, obtaining history and performing an appropriate physical exam, patient care, counseling and education, independently reviewing results, communicating with the patient/family and other providers, and coordinating care.    Yaneli Escamilla MD  Neuromuscular Neurology  [facility]  Office Phone Number: [phone]

## 2025-03-04 DIAGNOSIS — L73.9: ICD-10-CM

## 2025-03-04 DIAGNOSIS — L67.9: ICD-10-CM

## 2025-03-05 ENCOUNTER — OFFICE VISIT (OUTPATIENT)
Dept: NEUROLOGY | Facility: HOSPITAL | Age: 55
End: 2025-03-05
Payer: COMMERCIAL

## 2025-03-05 VITALS
HEART RATE: 64 BPM | RESPIRATION RATE: 18 BRPM | BODY MASS INDEX: 24.62 KG/M2 | SYSTOLIC BLOOD PRESSURE: 134 MMHG | HEIGHT: 70 IN | TEMPERATURE: 97.1 F | WEIGHT: 172 LBS | DIASTOLIC BLOOD PRESSURE: 86 MMHG

## 2025-03-05 DIAGNOSIS — G62.9 PERIPHERAL POLYNEUROPATHY: ICD-10-CM

## 2025-03-05 PROCEDURE — 99205 OFFICE O/P NEW HI 60 MIN: CPT | Performed by: PSYCHIATRY & NEUROLOGY

## 2025-03-05 PROCEDURE — 99215 OFFICE O/P EST HI 40 MIN: CPT | Mod: GC | Performed by: PSYCHIATRY & NEUROLOGY

## 2025-03-05 PROCEDURE — 3008F BODY MASS INDEX DOCD: CPT | Performed by: PSYCHIATRY & NEUROLOGY

## 2025-03-05 RX ORDER — FINASTERIDE 1 MG/1
1 TABLET, FILM COATED ORAL DAILY
Qty: 90 TABLET | Refills: 3 | Status: SHIPPED | OUTPATIENT
Start: 2025-03-05

## 2025-03-05 ASSESSMENT — PAIN SCALES - GENERAL: PAINLEVEL_OUTOF10: 0-NO PAIN

## 2025-03-05 ASSESSMENT — PATIENT HEALTH QUESTIONNAIRE - PHQ9
1. LITTLE INTEREST OR PLEASURE IN DOING THINGS: NOT AT ALL
SUM OF ALL RESPONSES TO PHQ9 QUESTIONS 1 AND 2: 0
2. FEELING DOWN, DEPRESSED OR HOPELESS: NOT AT ALL

## 2025-03-05 NOTE — PATIENT INSTRUCTIONS
Dear Mr. Grier,    You were seen in the clinic with Dr. Escamilla for your heat sensation in your legs. We are concern about peripheral neuropathy or possible entrapment of the nerves (trapped nerves) in your arms and legs.     We would like to get a further look into your nerve study with EMG (electromyogram) in your arms and legs. We will also check further blood work with vitamin level and autoimmune check (SARI level).     We will arrange a follow up after your EMG has been done.    It was a pleasure taking care of you.   Neurology team

## 2025-03-14 ENCOUNTER — HOSPITAL ENCOUNTER (OUTPATIENT)
Dept: NEUROLOGY | Facility: CLINIC | Age: 55
Discharge: HOME | End: 2025-03-14
Payer: COMMERCIAL

## 2025-03-14 DIAGNOSIS — G62.9 PERIPHERAL POLYNEUROPATHY: ICD-10-CM

## 2025-03-14 DIAGNOSIS — G62.9 PERIPHERAL POLYNEUROPATHY: Primary | ICD-10-CM

## 2025-03-14 PROCEDURE — 95912 NRV CNDJ TEST 11-12 STUDIES: CPT | Performed by: PSYCHIATRY & NEUROLOGY

## 2025-03-14 PROCEDURE — 95886 MUSC TEST DONE W/N TEST COMP: CPT | Mod: GC | Performed by: PSYCHIATRY & NEUROLOGY

## 2025-03-14 PROCEDURE — 95886 MUSC TEST DONE W/N TEST COMP: CPT | Performed by: PSYCHIATRY & NEUROLOGY

## 2025-03-14 NOTE — PROGRESS NOTES
March 14, 2025    EMG study completed today showed a sensory predominant neuropathy or could be a neuronopathy/ganglionopathy.     Differential includes autoimmune causes such a Sjogren's, or paraneoplastic such as anti-Hu, or if there is a B6 deficiency or toxicity, or if it is light chain related. He has bloodwork to check on vitamin levels, autoimmune encephalopathy panel, repeat checking Sjogren's antibodies.     He denies dry eyes, dry mouth or any GI symptoms.    Will obtain bloodwork. All bloodwork placed except for SARI panel as difficult to order this stand alone. Sent for repeat SSA and SSB. He knows to do bloodwork fasting.    Will follow-up once bloodwork has resulted.    If bloodwork is unrevealing, may need to consider PET, bone marrow biopsy.    Follow-up with myself and my fellow Nataliya Leiva.    Yaneli Escamilla MD  Neuromuscular Neurology  [facility]  Office Phone Number: [phone]

## 2025-03-15 ENCOUNTER — LAB (OUTPATIENT)
Dept: LAB | Facility: HOSPITAL | Age: 55
End: 2025-03-15
Payer: COMMERCIAL

## 2025-03-15 DIAGNOSIS — G62.9 POLYNEUROPATHY, UNSPECIFIED: Primary | ICD-10-CM

## 2025-03-15 LAB — PROT SERPL-MCNC: 7.2 G/DL (ref 6.4–8.2)

## 2025-03-15 PROCEDURE — 84165 PROTEIN E-PHORESIS SERUM: CPT

## 2025-03-15 PROCEDURE — 86341 ISLET CELL ANTIBODY: CPT

## 2025-03-15 PROCEDURE — 86334 IMMUNOFIX E-PHORESIS SERUM: CPT

## 2025-03-15 PROCEDURE — 86255 FLUORESCENT ANTIBODY SCREEN: CPT

## 2025-03-15 PROCEDURE — 84155 ASSAY OF PROTEIN SERUM: CPT

## 2025-03-16 LAB
A-TOCOPHEROL VIT E SERPL-MCNC: NORMAL
BETA+GAMMA TOCOPHEROL SERPL-MCNC: NORMAL MG/DL
COPPER BLD-MCNC: NORMAL UG/DL
METHYLMALONATE SERPL-SCNC: NORMAL
PYRIDOXAL PHOS SERPL-MCNC: NORMAL UG/L
VIT B1 BLD-SCNC: NORMAL NMOL/L
VIT B12 SERPL-MCNC: 529 PG/ML (ref 200–1100)

## 2025-03-17 LAB
ENA SS-A AB SER IA-ACNC: NORMAL AI
ENA SS-B AB SER IA-ACNC: NORMAL AI

## 2025-03-18 ENCOUNTER — APPOINTMENT (OUTPATIENT)
Dept: NEUROLOGY | Facility: HOSPITAL | Age: 55
End: 2025-03-18
Payer: COMMERCIAL

## 2025-03-19 LAB
ALBUMIN: 4.4 G/DL (ref 3.4–5)
ALPHA 1 GLOBULIN: 0.2 G/DL (ref 0.2–0.6)
ALPHA 2 GLOBULIN: 0.6 G/DL (ref 0.4–1.1)
BETA GLOBULIN: 0.8 G/DL (ref 0.5–1.2)
GAMMA GLOBULIN: 1.1 G/DL (ref 0.5–1.4)
IMMUNOFIXATION COMMENT: NORMAL
METHYLMALONATE SERPL-SCNC: 117 NMOL/L (ref 55–335)
PATH REVIEW - SERUM IMMUNOFIXATION: NORMAL
PATH REVIEW-SERUM PROTEIN ELECTROPHORESIS: NORMAL
PROTEIN ELECTROPHORESIS COMMENT: NORMAL
VIT B12 SERPL-MCNC: 529 PG/ML (ref 200–1100)

## 2025-03-20 DIAGNOSIS — G62.9 SENSORY NEUROPATHY: Primary | ICD-10-CM

## 2025-03-20 LAB
A-TOCOPHEROL VIT E SERPL-MCNC: 17.8 MG/L (ref 5.7–19.9)
BETA+GAMMA TOCOPHEROL SERPL-MCNC: <1 MG/L
COPPER BLD-MCNC: 75 MCG/DL
PYRIDOXAL PHOS SERPL-MCNC: 77.1 NG/ML (ref 2.1–21.7)
VIT B1 BLD-SCNC: NORMAL NMOL/L

## 2025-03-21 ENCOUNTER — DOCUMENTATION (OUTPATIENT)
Dept: NEUROLOGY | Facility: CLINIC | Age: 55
End: 2025-03-21
Payer: COMMERCIAL

## 2025-03-21 DIAGNOSIS — G62.9 PERIPHERAL POLYNEUROPATHY: Primary | ICD-10-CM

## 2025-03-21 NOTE — PROGRESS NOTES
March 21, 2025    Spoke with Mr. Grier. Discussed stopping his B complex tablet as his B6 level was elevated on bloodwork which can lead to injury to sensory nerves. We will recheck B6 levels in about 2-3 months, order has been placed. One lab test is still in process autoimmune encephalopathy panel. I will update him on that result once it is back. He will decide on whether or not to continue taking his multivitamin pill.    He will reach out to Dr. Powers's office to clarify when repeat bloodwork is needed for light chains, etc.      Yaneli Escamilla MD  Neuromuscular Neurology  [facility]  Office Phone Number: [phone]

## 2025-03-22 ENCOUNTER — TELEPHONE (OUTPATIENT)
Dept: PRIMARY CARE | Facility: CLINIC | Age: 55
End: 2025-03-22
Payer: COMMERCIAL

## 2025-03-22 NOTE — TELEPHONE ENCOUNTER
Birthweight: 3200 g (7 lb 0 9 oz)  Discharge weight:  2977 g (6 lb 9 oz)     Hepatitis B vaccination:    Hep B, Adolescent or Pediatric 2018       Mother's blood type: ABO Grouping   2018 A  Final     2018 Negative  Final     Baby's blood type:   2018 O  Final     2018 Negative  Final       Bilirubin:   Lab Units 01/03/18  1815   BILIRUBIN TOTAL mg/dL 5 67*       Hearing screen:  Initial Hearing Screen Results Left Ear: Pass  Initial Hearing Screen Results Right Ear: Pass  Hearing Screen Date: 01/03/18    CCHD screen: Pulse Ox Screen: Initial  CCHD Negative Screen: Pass - No Further Intervention Needed PA started 05/23/2023      Juwan Waller   Key: [id number] -   PA Case ID: [id number]  Abilify 10MG tablets   Spoke with pt. Will continue to do work up with neurology regarding neuropathy. Feeling good. Will try to increase to 300 nightly of gabapentin and let me know  Currently, will wait for labs per dr bernstein and [place] in August. If things change, we could get labs sooner. He is fine not getting bmbx at this time. May happen in future but if labs stay normal, may still watch  Nannette, he did not hear from [facility] about topical med. Can you call pharmacy to see if they have it still?

## 2025-03-27 LAB
AMPAR2 IGG SERPL QL CBA IFA: NEGATIVE
AMPHIPHYSIN IGG SER QL IA: NEGATIVE
ANNOTATION COMMENT IMP: ABNORMAL
CASPR2 IGG SER QL CBA IFA: NEGATIVE
CV2 AB SERPL QL IF: NEGATIVE
DPPX IGG SER QL CBA IFA: NEGATIVE
GABABR IGG SERPL QL CBA IFA: NEGATIVE
GAD65 AB SER-SCNC: 0.07 NMOL/L
GFAP ALPHA IGG SER QL IF: NEGATIVE
GLIAL NUC TYPE 1 AB SER QL IF: NEGATIVE
HU1 AB SER QL: NEGATIVE
HU2 AB SER QL IF: NEGATIVE
HU3 AB SER QL: NEGATIVE
IGLON5 IGG SER QL CBA IFA: NEGATIVE
IMMUNOLOGIST REVIEW: ABNORMAL
LGI1 IGG SER QL CBA IFA: NEGATIVE
MGLUR1 IGG SER QL IF: NEGATIVE
NEUROCHONDRIN AB SERPL QL IF: NEGATIVE
NIF IGG SER QL IF: NEGATIVE
NMDAR1 IGG SER QL CBA IFA: NEGATIVE
PCA-1 AB SER QL IF: NEGATIVE
PCA-2 AB SER QL IF: NEGATIVE
PCA-TR AB SER QL IF: NEGATIVE
PDE10A AB IFA,S: NEGATIVE
SEPTIN-7 IGG SERPL QL IF: NEGATIVE
TRIM46 AB IFA,S: NEGATIVE

## 2025-03-31 ENCOUNTER — DOCUMENTATION (OUTPATIENT)
Dept: NEUROLOGY | Facility: CLINIC | Age: 55
End: 2025-03-31
Payer: COMMERCIAL

## 2025-03-31 NOTE — PROGRESS NOTES
March 31, 2025    Spoke with Mr. Grier, last lab test was autoimmune encephalopathy antibody level--was especially interested in looking at ASHU-1 (Anti-Hu), given history of BRCA genetic history. This was negative. GAD65 Antibody showed a low positive titer 0.07, patient does not have a neurologic syndrome that is associated with GAD65, in addition titer typically has to be over 20 in neurologic diseases such as stiff person, cerebellar ataxias. TSH and HbA1c were normal as well as can be associated with glucose issues or thyroid autoimmune diseases. For now, main risk factor we have found is elevated B6 level, patient had been taking a B complex vitamin for a while which he has stopped. We will repeat the level in a couple months. He is also being followed by hematology/oncology for an elevated light chain, kappa had been elevated when checked last year, normal on most recent testing a month ago.    Has an appointment with us end of April. Will plan to recheck B6 level.    Yaneli Escamilla MD  Neuromuscular Neurology  [facility]  Office Phone Number: [phone]

## 2025-04-10 ENCOUNTER — TELEPHONE (OUTPATIENT)
Dept: PRIMARY CARE | Facility: CLINIC | Age: 55
End: 2025-04-10
Payer: COMMERCIAL

## 2025-04-10 DIAGNOSIS — I26.99 PULMONARY EMBOLISM, UNSPECIFIED CHRONICITY, UNSPECIFIED PULMONARY EMBOLISM TYPE, UNSPECIFIED WHETHER ACUTE COR PULMONALE PRESENT (MULTI): ICD-10-CM

## 2025-04-10 NOTE — TELEPHONE ENCOUNTER
Left message for him on his cell phone to consider adding oral minox to the finasteride orally. Told him to text me.

## 2025-04-10 NOTE — TELEPHONE ENCOUNTER
Email from patient:     About the [facility] compounded, 30 prescription is $67 and it’s not covered by insurance.  I believe that the oral finasteride that I am taking now is covered by insurance and I pay maybe $7 or $12 per month (or something like that).      Are there any other alternatives that can bring the monthly cost of the topical down a bit?  Otherwise, the difference is so big that unfortunately I am probably going to just stick with the oral finasteride.

## 2025-04-17 ENCOUNTER — APPOINTMENT (OUTPATIENT)
Dept: CARDIOLOGY | Facility: CLINIC | Age: 55
End: 2025-04-17
Payer: COMMERCIAL

## 2025-04-20 LAB
A-TOCOPHEROL VIT E SERPL-MCNC: NORMAL
BETA+GAMMA TOCOPHEROL SERPL-MCNC: NORMAL MG/DL
COPPER BLD-MCNC: NORMAL UG/DL
EST. AVERAGE GLUCOSE BLD GHB EST-MCNC: 114 MG/DL
EST. AVERAGE GLUCOSE BLD GHB EST-SCNC: 6.3 MMOL/L
HBA1C MFR BLD: 5.6 %
PYRIDOXAL PHOS SERPL-MCNC: NORMAL UG/L
VIT B1 BLD-SCNC: NORMAL NMOL/L

## 2025-04-21 ENCOUNTER — APPOINTMENT (OUTPATIENT)
Dept: NEUROLOGY | Facility: HOSPITAL | Age: 55
End: 2025-04-21
Payer: COMMERCIAL

## 2025-04-25 ENCOUNTER — PATIENT MESSAGE (OUTPATIENT)
Dept: PRIMARY CARE | Facility: CLINIC | Age: 55
End: 2025-04-25
Payer: COMMERCIAL

## 2025-04-25 DIAGNOSIS — F40.243 ANXIETY WITH FLYING: ICD-10-CM

## 2025-04-25 DIAGNOSIS — G62.9 NEUROPATHY: ICD-10-CM

## 2025-04-25 LAB
A-TOCOPHEROL VIT E SERPL-MCNC: 15.1 MG/L (ref 5.7–19.9)
BETA+GAMMA TOCOPHEROL SERPL-MCNC: <1 MG/L
COPPER BLD-MCNC: 76 MCG/DL
EST. AVERAGE GLUCOSE BLD GHB EST-MCNC: 114 MG/DL
EST. AVERAGE GLUCOSE BLD GHB EST-SCNC: 6.3 MMOL/L
HBA1C MFR BLD: 5.6 %
PYRIDOXAL PHOS SERPL-MCNC: 28.6 NG/ML (ref 2.1–21.7)
VIT B1 BLD-SCNC: NORMAL NMOL/L

## 2025-04-29 ENCOUNTER — OFFICE VISIT (OUTPATIENT)
Dept: CARDIOLOGY | Facility: CLINIC | Age: 55
End: 2025-04-29
Payer: COMMERCIAL

## 2025-04-29 VITALS
OXYGEN SATURATION: 97 % | BODY MASS INDEX: 24.6 KG/M2 | DIASTOLIC BLOOD PRESSURE: 73 MMHG | WEIGHT: 169 LBS | SYSTOLIC BLOOD PRESSURE: 113 MMHG | HEART RATE: 63 BPM

## 2025-04-29 DIAGNOSIS — I26.99 PULMONARY EMBOLISM, UNSPECIFIED CHRONICITY, UNSPECIFIED PULMONARY EMBOLISM TYPE, UNSPECIFIED WHETHER ACUTE COR PULMONALE PRESENT (MULTI): Primary | ICD-10-CM

## 2025-04-29 PROCEDURE — 99214 OFFICE O/P EST MOD 30 MIN: CPT | Performed by: INTERNAL MEDICINE

## 2025-04-29 PROCEDURE — 1036F TOBACCO NON-USER: CPT | Performed by: INTERNAL MEDICINE

## 2025-04-30 ENCOUNTER — OFFICE VISIT (OUTPATIENT)
Dept: NEUROLOGY | Facility: HOSPITAL | Age: 55
End: 2025-04-30
Payer: COMMERCIAL

## 2025-04-30 VITALS
TEMPERATURE: 97.1 F | DIASTOLIC BLOOD PRESSURE: 79 MMHG | HEIGHT: 70 IN | RESPIRATION RATE: 18 BRPM | SYSTOLIC BLOOD PRESSURE: 110 MMHG | BODY MASS INDEX: 24.2 KG/M2 | HEART RATE: 61 BPM | WEIGHT: 169 LBS

## 2025-04-30 DIAGNOSIS — G62.9 PERIPHERAL POLYNEUROPATHY: Primary | ICD-10-CM

## 2025-04-30 LAB
A-TOCOPHEROL VIT E SERPL-MCNC: 15.1 MG/L (ref 5.7–19.9)
BETA+GAMMA TOCOPHEROL SERPL-MCNC: <1 MG/L
COPPER BLD-MCNC: 76 MCG/DL
EST. AVERAGE GLUCOSE BLD GHB EST-MCNC: 114 MG/DL
EST. AVERAGE GLUCOSE BLD GHB EST-SCNC: 6.3 MMOL/L
HBA1C MFR BLD: 5.6 %
PYRIDOXAL PHOS SERPL-MCNC: 28.6 NG/ML (ref 2.1–21.7)
VIT B1 BLD-SCNC: 132 NMOL/L (ref 78–185)

## 2025-04-30 PROCEDURE — 3008F BODY MASS INDEX DOCD: CPT | Performed by: PSYCHIATRY & NEUROLOGY

## 2025-04-30 PROCEDURE — 99214 OFFICE O/P EST MOD 30 MIN: CPT | Mod: GC | Performed by: PSYCHIATRY & NEUROLOGY

## 2025-04-30 PROCEDURE — 99214 OFFICE O/P EST MOD 30 MIN: CPT | Performed by: PSYCHIATRY & NEUROLOGY

## 2025-04-30 ASSESSMENT — PAIN SCALES - GENERAL: PAINLEVEL_OUTOF10: 0-NO PAIN

## 2025-04-30 NOTE — PROGRESS NOTES
Neuromuscular Medicine Follow Up     Lawrence Grier, MRN: [id number], : 1970  Reason for Visit: No chief complaint on file.     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 His current neuro exam is only notable for mild hyperesthesia to temperature in the right thigh, and diffuse areflexia. No ataxia. Causes of non-length dependent sensory neuropathy or sensory neuronopathy include metabolic (eg, DM/pre diabetes, B12 deficiency, B6 excess, B1 deficiency, thyroid dysfunction), autoimmune/inflammatory cause (including rheumatological conditions), paraneoplastic process, paraproteinemia, toxicity, etc. We have sent extensive lab workup to evaluate these conditions (as listed below), his RANDY 65 ab was weakly elevated, which is non-specific and not considered to be pathogenic. B6 level was elevated (77,1), he has stopped vitamin B complex supplementation and this is down trending, no improvement of symptoms so far. Other labs were within normal limits.     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 Given his hx of positive BRCA1 heidy, a paraneoplastic process should be evaluated more thoroughly. Will contact his genetic team to see what is the best screening test for malignancy in his case. TTR amyloidosis is possible, thus will send TTR genetic testing. He has a question about potential toxic exposure from well water, for which we need to do more research and he plans to research as well. His prior urine heavy metal testing was negative.       Plan:  - Will send TTR genetic testing  - We will contact his genetic team who did the BRCA  gene screening previously to see what is the best cancer screening test in his case. His PSA was normal 2 months ago.   - Can increase gabapentin for symptomatic control.   - Follow up in August        OREN Gan  Neuromuscular Fellow       History of Present Illness:    Mr. Grier is a 54 y.o.  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     Today, he reports that his symptoms have not changed. He still has the heat sensation in bilateral thighs. When he sleeps on his side at night with legs touching each other, the heat sensation will get more intense and his legs will start sweating. He thinks now he is not as sensitive to cold temperature as how he was in the past. He increased the dose to 300mg daily but did not notice a difference, thus went back down to 100mg daily. No side effect from gabapentin.      He denies weakness. No balance issue. No ataxia. Reports occasional lightheadedness when getting up too fast. No bowel or bladder symptoms. No abnormal sweating. No GI symptoms.     Weight has been stable. No weight loss. No other constitutional symptoms.     He has stopped taking B complex supplementation and B6 level is down trending but still mildly elevated.     Now he recalls that his symptoms started after he moved to his new house. They use well water for that house. The well water was tested before they moved in, but he is not sure what had been tested, probably just bacteria, fungi, etc. He had urine heavy metal testing which was negative.     Prior History:     Relevant past medical, surgical, family, and social histories, along with ROS was reviewed and pertinent details noted above.     RX Allergies[1]   Medications:  Current Medications[2]       Physical Exam:   /79   " Pulse 61   Temp 36.2 °C (97.1 °F) (Temporal)   Resp 18   Ht 1.765 m (5' 9.5\")   Wt 76.7 kg (169 lb)   BMI 24.60 kg/m²      General Appearance:  No distress, alert, interactive and cooperative.   Skin: No rash present on limbs or extensor surfaces    Neurological:  Mental status: the patient provided an accurate history, language was normal.   Cranial Nerves:  CN 3, 4, 6   Lids symmetric; no ptosis.   EOMs normal alignment, full range, with normal pursuit and convergence  No nystagmus.   CN 5   Facial sensation intact bilaterally.   Jaw opening 5/5   CN 7   Normal and symmetric facial strength. Nasolabial folds symmetric.   Able to lift eyebrows and close eye lids with eye lashes buried symmetrically.   CN 8   Hearing intact to conversation.     CN 9, 10   Palate elevates symmetrically.   Phonation within normal limits, no dysarthria.   CN 11   Normal strength of shoulder shrug and neck turning.   CN 12   Tongue midline, with normal bulk and strength; no fasciculations.     Motor:   Muscle bulk: Normal throughout.  Muscle tone: Normal in both upper and lower extremities.  Movements: No fasciculations, tremors, or other abnormal movement.     Manual Muscle Testing (MMT) reveals the following MRC grades:  Neck Flexion 5  Neck Extension 5  R L   Shoulder abduction  5 5  Elbow flexion   5 5  Elbow extension  5 5  Finger flexion   5 5  Finger extension  5 5  Finger abduction  5 5  Thumb abduction   5 5  Hip flexion   5 5  Hip abduction    5 5  Hip adduction    5 5  Knee flexion   5 5  Knee extension  5 5  Ankle dorsiflexion  5 5  Ankle plantarflexion  5 5  Ankle Inversion   5 5  Ankle Eversion   5 5  Big toe extension  5 5  Toe flexion   5 5    Reflexes:     R          L  BR:               0         0  Biceps:         0         0  Triceps:        0         0  Knee:           0          0  Ankle:          0          0    Babinski: Toes are down going  Mendez's: Not present  No clonus or other pathological " reflexes      Sensory:   The sensation to temperature appears to be slightly increased (more sensitive) in the right thigh compared to the rest parts of his body. Sensation to touch, pinprick are same/intact throughout the body. Normal vibration sensation in toes, ankles and knees. Normal positional sensation in toes.     Coordination:    In both upper extremities, finger-nose-finger was intact without dysmetria or overshoot.      Gait:   Normal casual gait.     Results:     The following labs, imaging, and/or data were personally reviewed and demonstrated:     Prior work up before initial visit  - TSH (2/2025): 2.64 WNL  - Vit B12 (9/2024): 591 WNL  - Folate (9/2024): >24 WNL  - Urine heavy metal (11/2024): neg  - Anti SSA, SSB (11/2024): neg  - JAVIER (7/2024): neg  - RF, ESR (7/2024): neg  - Keppa light chain: 2.13 (H, 7/2024) --> 1.95 (H, 11/2024) --> 1.85 (WNL, 2/2025)  - SPEP (11/2024): normal --> (2/2025): normal  - HbA1C (2/2025): 5.5  - Pending labs for: copper, vitamin E, vitamin B1, vitamin B6  - EMG (11/4/24): The study is abnormal. There is electrophysiological evidence for a sensorimotor, axonal periperal neuropathy with additional mild peroneal mononeuropathy at the left fibular head. There is electrophysiological evidence suggestive of an additional left L4 radiculopathy. There is no definite electrophysiologial evidence for a lumbosacral plexopathy in either leg.    Repeat EMG in March 2025:   Reviewed. 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     Labs after initial visit in March 2025:   autoimmune encephalopathy antibody panel: ASHU-1 (Anti-Hu) was negative. RANDY 65 weakly positive and is non-specific and not sufficient for diagnosis of any neurological disorders associated with it.   TSH and HbA1c were normal   B6 77.1 --> 28.6  Copper 75, 76  Vitamin B1 not resulted (sample not protected)  B12 529 wnl,   wnl  SSA & SSB ab negative  Vit E wnl  SPEP + OCTAVIA wnl     -----------------------------------------------------------------------------------------------------------------------------  ATTENDING ATTESTATION    I saw patient with trainee and agree with the edits, history and exam that I helped formulate per above.    I personally spent 30 minutes on the day of the visit completing the review of the medical record and outside records, obtaining history and performing an appropriate physical exam, patient care, counseling and education, independently reviewing results, communicating with the patient, coordinating care and performing appropriate clinical documentation.    Yaneli Escamilla MD  Neuromuscular Neurology  [facility]  Office Phone Number: [phone]          [1] No Known Allergies  [2]   Current Outpatient Medications:     albuterol 90 mcg/actuation inhaler, Inhale 2 puffs every 6 hours if needed for wheezing., Disp: 18 g, Rfl: 1    apixaban (Eliquis) 5 mg tablet, Take 1 tablet (5 mg) by mouth 2 times a day., Disp: 180 tablet, Rfl: 3    finasteride (Propecia) 1 mg tablet, TAKE ONE TABLET BY MOUTH ONCE DAILY, Disp: 90 tablet, Rfl: 3    oxymetazoline (Afrin) 0.05 % nasal spray, Administer 2 sprays into each nostril once daily at bedtime. Do not use for more than 3 days., Disp: , Rfl:     tadalafil (Cialis) 2.5 mg tablet, Take 1 tablet (2.5 mg) by mouth once daily., Disp: , Rfl:     b complex 0.4 mg tablet, Take 1 tablet by mouth once daily. (Patient not taking: Reported on 4/30/2025), Disp: , Rfl:     diazePAM (Valium) 10 mg tablet, Take 30 min prior to flight. (Patient not taking: Reported on 4/30/2025), Disp: 24 tablet, Rfl: 0    gabapentin (Neurontin) 100 mg capsule, Take 1 capsule (100 mg) by mouth once daily at bedtime for 3 days, THEN 2 capsules (200 mg) once daily at bedtime for 3 days, THEN 3 capsules (300 mg) once daily at bedtime for 24 days., Disp: 81 capsule, Rfl: 1     minoxidiL-finasteride 7-0.1 % solution, Apply 1 Application topically 2 times a day. (Patient not taking: Reported on 4/30/2025), Disp: 30 g, Rfl: 11    multivitamin tablet, Take 1 tablet by mouth once daily. (Patient not taking: Reported on 4/30/2025), Disp: , Rfl:

## 2025-05-07 RX ORDER — GABAPENTIN 100 MG/1
300 CAPSULE ORAL NIGHTLY
Qty: 90 CAPSULE | Refills: 5 | Status: SHIPPED | OUTPATIENT
Start: 2025-05-07 | End: 2025-11-03

## 2025-05-07 RX ORDER — DIAZEPAM 10 MG/1
TABLET ORAL
Qty: 24 TABLET | Refills: 0 | Status: SHIPPED | OUTPATIENT
Start: 2025-05-07 | End: 2025-05-09

## 2025-05-09 ENCOUNTER — TELEPHONE (OUTPATIENT)
Dept: PRIMARY CARE | Facility: CLINIC | Age: 55
End: 2025-05-09
Payer: COMMERCIAL

## 2025-05-09 ENCOUNTER — TELEPHONE (OUTPATIENT)
Dept: NEUROLOGY | Facility: HOSPITAL | Age: 55
End: 2025-05-09
Payer: COMMERCIAL

## 2025-05-09 DIAGNOSIS — F40.243 ANXIETY WITH FLYING: Primary | ICD-10-CM

## 2025-05-09 DIAGNOSIS — Z15.09 BRCA1 GENE MUTATION POSITIVE: ICD-10-CM

## 2025-05-09 DIAGNOSIS — G54.9 SENSORY NEURONOPATHY: Primary | ICD-10-CM

## 2025-05-09 DIAGNOSIS — Z15.01 BRCA1 GENE MUTATION POSITIVE: ICD-10-CM

## 2025-05-09 RX ORDER — DIAZEPAM 10 MG/1
10 TABLET ORAL EVERY 12 HOURS PRN
Qty: 25 TABLET | Refills: 0 | Status: SHIPPED | OUTPATIENT
Start: 2025-05-09 | End: 2025-05-16

## 2025-05-09 NOTE — TELEPHONE ENCOUNTER
I called the patient to discuss the next step.     Will plan to get PET scan if TTR genetic testing is negative. Will also send FGFR3 antibody if TTR is negative.     PET scan ordered. Will place FGFR3 ab after TTR genetic testing is resulted.       OREN Gan  Neuromuscular Fellow

## 2025-05-09 NOTE — TELEPHONE ENCOUNTER
Pharmacy needs clarification regarding his diazepam.     1 tablet 30 minutes prior to  a flight?  Or 1 tablet maximum daily?    Please advise.

## 2025-05-09 NOTE — TELEPHONE ENCOUNTER
Spoke to marie about the gene test for amyloid and pet scan due to brca gene and paraneoplastic. Reassured that we are looking for all possible causes.

## 2025-05-21 ENCOUNTER — TELEPHONE (OUTPATIENT)
Dept: NEUROLOGY | Facility: HOSPITAL | Age: 55
End: 2025-05-21
Payer: COMMERCIAL

## 2025-05-21 DIAGNOSIS — G62.9 SENSORY NEUROPATHY: Primary | ICD-10-CM

## 2025-05-24 PROCEDURE — 84182 PROTEIN WESTERN BLOT TEST: CPT

## 2025-05-24 PROCEDURE — 83520 IMMUNOASSAY QUANT NOS NONAB: CPT

## 2025-05-27 ENCOUNTER — HOSPITAL ENCOUNTER (OUTPATIENT)
Dept: RADIOLOGY | Facility: CLINIC | Age: 55
Discharge: HOME | End: 2025-05-27
Payer: COMMERCIAL

## 2025-05-27 ENCOUNTER — LAB (OUTPATIENT)
Dept: LAB | Facility: HOSPITAL | Age: 55
End: 2025-05-27
Payer: COMMERCIAL

## 2025-05-27 DIAGNOSIS — Z15.09 BRCA1 GENE MUTATION POSITIVE: ICD-10-CM

## 2025-05-27 DIAGNOSIS — Z15.01 BRCA1 GENE MUTATION POSITIVE: ICD-10-CM

## 2025-05-27 DIAGNOSIS — G62.9 POLYNEUROPATHY, UNSPECIFIED: Primary | ICD-10-CM

## 2025-05-27 DIAGNOSIS — G54.9 SENSORY NEURONOPATHY: ICD-10-CM

## 2025-05-27 PROCEDURE — 3430000001 HC RX 343 DIAGNOSTIC RADIOPHARMACEUTICALS: Performed by: STUDENT IN AN ORGANIZED HEALTH CARE EDUCATION/TRAINING PROGRAM

## 2025-05-27 PROCEDURE — 78816 PET IMAGE W/CT FULL BODY: CPT | Mod: PI

## 2025-05-27 PROCEDURE — A9552 F18 FDG: HCPCS | Performed by: STUDENT IN AN ORGANIZED HEALTH CARE EDUCATION/TRAINING PROGRAM

## 2025-05-27 RX ORDER — FLUDEOXYGLUCOSE F 18 200 MCI/ML
14.1 INJECTION, SOLUTION INTRAVENOUS
Status: COMPLETED | OUTPATIENT
Start: 2025-05-27 | End: 2025-05-27

## 2025-05-27 RX ADMIN — FLUDEOXYGLUCOSE F 18 14.1 MILLICURIE: 200 INJECTION, SOLUTION INTRAVENOUS at 10:18

## 2025-05-30 ENCOUNTER — TELEPHONE (OUTPATIENT)
Dept: NEUROLOGY | Facility: HOSPITAL | Age: 55
End: 2025-05-30
Payer: COMMERCIAL

## 2025-05-30 DIAGNOSIS — G54.9 SENSORY NEURONOPATHY: Primary | ICD-10-CM

## 2025-05-30 NOTE — TELEPHONE ENCOUNTER
I called the patient together with Dr Escamilla to talk about the next step. PET scan is unremarkable for cancer.     He has sensory neuronopathy more likely than sensory neuropathy given the non-length dependent distribution. Due to his BRCA 1 genetic mutation, we think a through workup for paraneoplastic process and malignancy is needed. We will contact his PCP, Dr Wells, to conduct a prostate exam and also check PSA periodically for monitoring. His colonoscopy is uptodate and the result was unremarkable on previous one. We will obtain testicular ultrasound to evaluate  malignancy. We will plan to repeat EMG on 6/16. The [facility] sensory neuropathy/neuronopathy panel has not resulted, thus will follow up on the result.     If all the above evaluations are negative, may consider lip biopsy for Sjogren's disease and repeat PET scan in 6 months.       OREN Gan  Neuromuscular Fellow

## 2025-06-16 ENCOUNTER — HOSPITAL ENCOUNTER (OUTPATIENT)
Dept: NEUROLOGY | Facility: HOSPITAL | Age: 55
Discharge: HOME | End: 2025-06-16
Payer: COMMERCIAL

## 2025-06-16 ENCOUNTER — DOCUMENTATION (OUTPATIENT)
Dept: NEUROLOGY | Facility: HOSPITAL | Age: 55
End: 2025-06-16

## 2025-06-16 DIAGNOSIS — G54.9 SENSORY NEURONOPATHY: ICD-10-CM

## 2025-06-16 PROCEDURE — 95886 MUSC TEST DONE W/N TEST COMP: CPT | Mod: GC | Performed by: PSYCHIATRY & NEUROLOGY

## 2025-06-16 PROCEDURE — 95913 NRV CNDJ TEST 13/> STUDIES: CPT | Performed by: PSYCHIATRY & NEUROLOGY

## 2025-06-16 PROCEDURE — 95886 MUSC TEST DONE W/N TEST COMP: CPT | Performed by: PSYCHIATRY & NEUROLOGY

## 2025-06-16 NOTE — PROGRESS NOTES
Brief neuromuscular clinic follow up Note      EMG was performed today and showed similar results compared to the prior EMG in March 2025. The sensory amplitudes were diffusely low, non-length-dependent, with some asymmetry between 2 sides. Motor nerve conduction studies were all normal. This further confirmed the presence of a non-length-dependent sensory neuronopathy or axonopathy, and this is stable currently.    Patient reports no change of his sensory symptoms. No other new symptoms.      We have reached out to his PCP to make sure he gets updated cancer screening, including prostate cancer. Recent PSA is normal. We ordered testicular ultrasound, he got a call but the test has not been done yet.     We sent sensory neuropathy/neuronopathy antibody panel to the [facility], the results are still pending.     If all the above results are back and negative, the most likely cause of his symptom would be vitamin B6 toxicity. May consider lip biopsy to evaluate sjogren's syndrome, though he does not have sicca symptoms. May plan to releat cancer screening in about 6 months.     Follow up is scheduled on 8/26/2025.

## 2025-06-20 DIAGNOSIS — G62.9 SENSORY NEUROPATHY: ICD-10-CM

## 2025-06-25 ENCOUNTER — APPOINTMENT (OUTPATIENT)
Dept: NEUROLOGY | Facility: CLINIC | Age: 55
End: 2025-06-25
Payer: COMMERCIAL

## 2025-07-17 LAB — SCAN RESULT: NORMAL

## 2025-08-18 ENCOUNTER — APPOINTMENT (OUTPATIENT)
Dept: HEMATOLOGY/ONCOLOGY | Facility: CLINIC | Age: 55
End: 2025-08-18
Payer: COMMERCIAL

## 2025-08-26 ENCOUNTER — APPOINTMENT (OUTPATIENT)
Dept: NEUROLOGY | Facility: HOSPITAL | Age: 55
End: 2025-08-26
Payer: COMMERCIAL

## 2025-09-02 PROCEDURE — 84155 ASSAY OF PROTEIN SERUM: CPT

## 2025-09-02 PROCEDURE — 85025 COMPLETE CBC W/AUTO DIFF WBC: CPT

## 2025-09-02 PROCEDURE — 80053 COMPREHEN METABOLIC PANEL: CPT

## 2025-09-02 PROCEDURE — 83521 IG LIGHT CHAINS FREE EACH: CPT
